# Patient Record
Sex: MALE | Race: WHITE | NOT HISPANIC OR LATINO | Employment: FULL TIME | URBAN - METROPOLITAN AREA
[De-identification: names, ages, dates, MRNs, and addresses within clinical notes are randomized per-mention and may not be internally consistent; named-entity substitution may affect disease eponyms.]

---

## 2017-02-22 ENCOUNTER — TELEPHONE (OUTPATIENT)
Dept: INTERNAL MEDICINE | Facility: CLINIC | Age: 37
End: 2017-02-22

## 2017-02-22 NOTE — TELEPHONE ENCOUNTER
----- Message from FELY Borjas Jr., MD sent at 2/22/2017  6:43 AM CST -----  Contact: pt  Patient needs to be seen.  ----- Message -----     From: Eric Denson LPN     Sent: 2/21/2017   5:05 PM       To: FELY Borjas Jr., MD        ----- Message -----     From: Elissa Romero     Sent: 2/21/2017  10:54 AM       To: Indio Sampson Jr Staff    Pt states he is having problems sleeping and having a hard time breathing after he falls asleep, pt wants to be advised, pt can be reached at [phone]///thxMW

## 2017-02-22 NOTE — TELEPHONE ENCOUNTER
----- Message from FELY Borjas Jr., MD sent at 2/22/2017  6:43 AM CST -----  Contact: pt  Patient needs to be seen.  ----- Message -----     From: Eric Denson LPN     Sent: 2/21/2017   5:05 PM       To: FELY Borjas Jr., MD        ----- Message -----     From: Elissa Romero     Sent: 2/21/2017  10:54 AM       To: Indio Sampson Jr Staff    Pt states he is having problems sleeping and having a hard time breathing after he falls asleep, pt wants to be advised, pt can be reached at [phone]///thxMW

## 2017-10-31 ENCOUNTER — TELEPHONE (OUTPATIENT)
Dept: INTERNAL MEDICINE | Facility: CLINIC | Age: 37
End: 2017-10-31

## 2017-10-31 NOTE — TELEPHONE ENCOUNTER
----- Message from Samantha Lombardo sent at 10/31/2017  8:22 AM CDT -----  Contact: pt  Please call pt @ [phone], pt has a medical question for nurse.

## 2017-10-31 NOTE — TELEPHONE ENCOUNTER
Returned call to pt. Says his wife recently disclosed that she's had an affair, and advised him that she's been exposed to HPV. Wants to know if there's anything he should be concerned about.  Please advise.

## 2017-11-02 NOTE — TELEPHONE ENCOUNTER
"Returned call to pt regarding STD exposure to advise per Dr. Borjas: Not much can be done for male screening for HPV. He needs to come in to be evaluated for other STDs.     Pt states that the affair was over a year ago, and his wife has recently come back with a "clean bill of health." State that's the only thing that he was really concerned about.  "

## 2018-01-03 ENCOUNTER — OFFICE VISIT (OUTPATIENT)
Dept: INTERNAL MEDICINE | Facility: CLINIC | Age: 38
End: 2018-01-03
Payer: COMMERCIAL

## 2018-01-03 ENCOUNTER — LAB VISIT (OUTPATIENT)
Dept: LAB | Facility: HOSPITAL | Age: 38
End: 2018-01-03
Attending: PHYSICIAN ASSISTANT
Payer: COMMERCIAL

## 2018-01-03 VITALS
SYSTOLIC BLOOD PRESSURE: 138 MMHG | OXYGEN SATURATION: 96 % | HEART RATE: 106 BPM | TEMPERATURE: 99 F | BODY MASS INDEX: 47.74 KG/M2 | HEIGHT: 68 IN | DIASTOLIC BLOOD PRESSURE: 106 MMHG | WEIGHT: 315 LBS | RESPIRATION RATE: 18 BRPM

## 2018-01-03 DIAGNOSIS — R35.89 POLYURIA: Primary | ICD-10-CM

## 2018-01-03 DIAGNOSIS — R35.89 POLYURIA: ICD-10-CM

## 2018-01-03 PROBLEM — E66.01 MORBID OBESITY WITH BMI OF 50.0-59.9, ADULT: Status: ACTIVE | Noted: 2018-01-03

## 2018-01-03 LAB
ALBUMIN SERPL BCP-MCNC: 4.2 G/DL
ALP SERPL-CCNC: 62 U/L
ALT SERPL W/O P-5'-P-CCNC: 31 U/L
ANION GAP SERPL CALC-SCNC: 10 MMOL/L
AST SERPL-CCNC: 24 U/L
BACTERIA #/AREA URNS HPF: ABNORMAL /HPF
BASOPHILS # BLD AUTO: 0.04 K/UL
BASOPHILS NFR BLD: 0.5 %
BILIRUB SERPL-MCNC: 0.6 MG/DL
BILIRUB UR QL STRIP: NEGATIVE
BUN SERPL-MCNC: 16 MG/DL
CALCIUM SERPL-MCNC: 11.1 MG/DL
CHLORIDE SERPL-SCNC: 106 MMOL/L
CLARITY UR: CLEAR
CO2 SERPL-SCNC: 28 MMOL/L
COLOR UR: YELLOW
CREAT SERPL-MCNC: 1.6 MG/DL
DIFFERENTIAL METHOD: ABNORMAL
EOSINOPHIL # BLD AUTO: 0.1 K/UL
EOSINOPHIL NFR BLD: 1.4 %
ERYTHROCYTE [DISTWIDTH] IN BLOOD BY AUTOMATED COUNT: 13.5 %
EST. GFR  (AFRICAN AMERICAN): >60 ML/MIN/1.73 M^2
EST. GFR  (NON AFRICAN AMERICAN): 54 ML/MIN/1.73 M^2
ESTIMATED AVG GLUCOSE: 108 MG/DL
GLUCOSE SERPL-MCNC: 94 MG/DL
GLUCOSE UR QL STRIP: NEGATIVE
HBA1C MFR BLD HPLC: 5.4 %
HCT VFR BLD AUTO: 43.8 %
HGB BLD-MCNC: 15.1 G/DL
HGB UR QL STRIP: ABNORMAL
KETONES UR QL STRIP: NEGATIVE
LEUKOCYTE ESTERASE UR QL STRIP: NEGATIVE
LYMPHOCYTES # BLD AUTO: 2.4 K/UL
LYMPHOCYTES NFR BLD: 28.2 %
MCH RBC QN AUTO: 31.2 PG
MCHC RBC AUTO-ENTMCNC: 34.5 G/DL
MCV RBC AUTO: 91 FL
MICROSCOPIC COMMENT: ABNORMAL
MONOCYTES # BLD AUTO: 0.9 K/UL
MONOCYTES NFR BLD: 11.1 %
NEUTROPHILS # BLD AUTO: 5 K/UL
NEUTROPHILS NFR BLD: 58.8 %
NITRITE UR QL STRIP: NEGATIVE
PH UR STRIP: 6 [PH] (ref 5–8)
PLATELET # BLD AUTO: 369 K/UL
PMV BLD AUTO: 9.7 FL
POTASSIUM SERPL-SCNC: 3.9 MMOL/L
PROT SERPL-MCNC: 8.1 G/DL
PROT UR QL STRIP: NEGATIVE
RBC # BLD AUTO: 4.84 M/UL
RBC #/AREA URNS HPF: 6 /HPF (ref 0–4)
SODIUM SERPL-SCNC: 144 MMOL/L
SP GR UR STRIP: 1.01 (ref 1–1.03)
SQUAMOUS #/AREA URNS HPF: 2 /HPF
URN SPEC COLLECT METH UR: ABNORMAL
WBC # BLD AUTO: 8.44 K/UL
WBC #/AREA URNS HPF: 10 /HPF (ref 0–5)

## 2018-01-03 PROCEDURE — 87086 URINE CULTURE/COLONY COUNT: CPT

## 2018-01-03 PROCEDURE — 87088 URINE BACTERIA CULTURE: CPT

## 2018-01-03 PROCEDURE — 99999 PR PBB SHADOW E&M-EST. PATIENT-LVL IV: CPT | Mod: PBBFAC,,, | Performed by: PHYSICIAN ASSISTANT

## 2018-01-03 PROCEDURE — 36415 COLL VENOUS BLD VENIPUNCTURE: CPT | Mod: PO

## 2018-01-03 PROCEDURE — 85025 COMPLETE CBC W/AUTO DIFF WBC: CPT | Mod: PO

## 2018-01-03 PROCEDURE — 83036 HEMOGLOBIN GLYCOSYLATED A1C: CPT

## 2018-01-03 PROCEDURE — 99203 OFFICE O/P NEW LOW 30 MIN: CPT | Mod: S$GLB,,, | Performed by: PHYSICIAN ASSISTANT

## 2018-01-03 PROCEDURE — 81000 URINALYSIS NONAUTO W/SCOPE: CPT | Mod: PO

## 2018-01-03 PROCEDURE — 80053 COMPREHEN METABOLIC PANEL: CPT | Mod: PO

## 2018-01-03 RX ORDER — IBUPROFEN 200 MG
200 TABLET ORAL
COMMUNITY
End: 2021-01-05

## 2018-01-03 NOTE — PROGRESS NOTES
1.  Subjective:       Patient ID: Basilio Jain is a 37 y.o.W/ male.    Chief Complaint: Urinary Frequency; Urinary Urgency; and Dysuria    HPI         He comes in by himself today and has the above problem.  He is essentially a new patient to the clinic.  About 4-5 days ago he started urinating almost every hour during the daytime and he got up several times at night to do the same thing.  He doesn't really have any dysuria, pyuria, hematuria, urethral discharge, increase in normal odor, polydipsia, or any sores on his body that are slow to heal.  He has no history of diabetes.  He says his wife did have [facility]IC once about 18 months ago and she did have her self checked for STDs.  They didn't find her to have any except for HPV.  He has not himself noticed any warts on the penis or near the opening of the urethral meatus.  She has also had a Pap smear since that time and did not have any warts there also.    Review of Systems    Otherwise negative concerning the , INTEGUMENT, and STD system review.    Objective:      Physical Exam    Random fingerstick glucose here was 96.  CHEST: Clear BS anterior to posterior.  Intensity is normal.  There is no wheeze, rhonchi, or rales heard.  HEART: RSR.  S1 is greater than S2.  There is no murmur or gallop.  ABDOMEN: Exogenously obese with normal bowel sounds.  Nontender.  There is no guarding or rebound.  There is no organomegaly or mass felt palpation percussion.    Assessment:       1. Polyuria        Plan:       1.  Will get UA, C&S of urine, CBC, Chem-20, Hgb A1c, FLP's and follow-up after these tests are done.

## 2018-01-04 ENCOUNTER — PATIENT MESSAGE (OUTPATIENT)
Dept: INTERNAL MEDICINE | Facility: CLINIC | Age: 38
End: 2018-01-04

## 2018-01-04 LAB — BACTERIA UR CULT: NORMAL

## 2018-01-08 DIAGNOSIS — N30.00 ACUTE CYSTITIS WITHOUT HEMATURIA: Primary | ICD-10-CM

## 2018-01-08 RX ORDER — AZITHROMYCIN 500 MG/1
500 TABLET, FILM COATED ORAL DAILY
Qty: 10 TABLET | Refills: 0 | Status: SHIPPED | OUTPATIENT
Start: 2018-01-08 | End: 2018-01-18

## 2018-01-10 ENCOUNTER — TELEPHONE (OUTPATIENT)
Dept: INTERNAL MEDICINE | Facility: CLINIC | Age: 38
End: 2018-01-10

## 2018-01-10 NOTE — TELEPHONE ENCOUNTER
Called pt. to notified him of next Annual Appointment which is on 6/4/2018. No answer. Left voicemail.

## 2018-03-26 NOTE — TELEPHONE ENCOUNTER
Called patient to schedule appt per request from Dr. Borjas. He verbalized understanding.//rlt   Patient is calling in needing an appt for his swollen left testicle. Please advise him with an appt.

## 2020-12-29 ENCOUNTER — PATIENT MESSAGE (OUTPATIENT)
Dept: INTERNAL MEDICINE | Facility: CLINIC | Age: 40
End: 2020-12-29

## 2020-12-29 ENCOUNTER — OFFICE VISIT (OUTPATIENT)
Dept: CARDIOLOGY | Facility: CLINIC | Age: 40
End: 2020-12-29
Payer: COMMERCIAL

## 2020-12-29 ENCOUNTER — HOSPITAL ENCOUNTER (OUTPATIENT)
Dept: CARDIOLOGY | Facility: HOSPITAL | Age: 40
Discharge: HOME | End: 2020-12-29
Attending: PHYSICIAN ASSISTANT
Payer: COMMERCIAL

## 2020-12-29 ENCOUNTER — HOSPITAL ENCOUNTER (OUTPATIENT)
Dept: RADIOLOGY | Facility: HOSPITAL | Age: 40
Discharge: HOME | End: 2020-12-29
Attending: PHYSICIAN ASSISTANT
Payer: COMMERCIAL

## 2020-12-29 ENCOUNTER — OFFICE VISIT (OUTPATIENT)
Dept: INTERNAL MEDICINE | Facility: CLINIC | Age: 40
End: 2020-12-29
Payer: COMMERCIAL

## 2020-12-29 VITALS
OXYGEN SATURATION: 97 % | HEART RATE: 105 BPM | SYSTOLIC BLOOD PRESSURE: 142 MMHG | DIASTOLIC BLOOD PRESSURE: 78 MMHG | BODY MASS INDEX: 46.65 KG/M2 | TEMPERATURE: 99 F | HEIGHT: 69 IN | WEIGHT: 315 LBS | RESPIRATION RATE: 20 BRPM

## 2020-12-29 VITALS
SYSTOLIC BLOOD PRESSURE: 140 MMHG | BODY MASS INDEX: 54.11 KG/M2 | WEIGHT: 315 LBS | OXYGEN SATURATION: 99 % | DIASTOLIC BLOOD PRESSURE: 88 MMHG | HEART RATE: 106 BPM

## 2020-12-29 DIAGNOSIS — Z01.818 PRE-OP EXAM: ICD-10-CM

## 2020-12-29 DIAGNOSIS — E66.01 MORBID OBESITY WITH BMI OF 50.0-59.9, ADULT: ICD-10-CM

## 2020-12-29 DIAGNOSIS — R94.31 ABNORMAL EKG: ICD-10-CM

## 2020-12-29 DIAGNOSIS — I10 ESSENTIAL HYPERTENSION: ICD-10-CM

## 2020-12-29 DIAGNOSIS — Z01.818 PRE-OP EXAM: Primary | ICD-10-CM

## 2020-12-29 DIAGNOSIS — Z01.810 PREOP CARDIOVASCULAR EXAM: Primary | ICD-10-CM

## 2020-12-29 DIAGNOSIS — R06.83 SNORES: ICD-10-CM

## 2020-12-29 DIAGNOSIS — Z00.00 PREVENTATIVE HEALTH CARE: ICD-10-CM

## 2020-12-29 PROCEDURE — 99999 PR PBB SHADOW E&M-EST. PATIENT-LVL IV: CPT | Mod: PBBFAC,,, | Performed by: INTERNAL MEDICINE

## 2020-12-29 PROCEDURE — 71046 X-RAY EXAM CHEST 2 VIEWS: CPT | Mod: 26,,, | Performed by: RADIOLOGY

## 2020-12-29 PROCEDURE — 3008F PR BODY MASS INDEX (BMI) DOCUMENTED: ICD-10-PCS | Mod: CPTII,S$GLB,, | Performed by: PHYSICIAN ASSISTANT

## 2020-12-29 PROCEDURE — 99244 PR OFFICE CONSULTATION,LEVEL IV: ICD-10-PCS | Mod: S$PBB,,, | Performed by: INTERNAL MEDICINE

## 2020-12-29 PROCEDURE — 71046 X-RAY EXAM CHEST 2 VIEWS: CPT | Mod: TC

## 2020-12-29 PROCEDURE — 99999 PR PBB SHADOW E&M-EST. PATIENT-LVL IV: ICD-10-PCS | Mod: PBBFAC,,, | Performed by: INTERNAL MEDICINE

## 2020-12-29 PROCEDURE — 99214 PR OFFICE/OUTPT VISIT, EST, LEVL IV, 30-39 MIN: ICD-10-PCS | Mod: S$GLB,,, | Performed by: PHYSICIAN ASSISTANT

## 2020-12-29 PROCEDURE — 99999 PR PBB SHADOW E&M-EST. PATIENT-LVL V: ICD-10-PCS | Mod: PBBFAC,,, | Performed by: PHYSICIAN ASSISTANT

## 2020-12-29 PROCEDURE — 99244 OFF/OP CNSLTJ NEW/EST MOD 40: CPT | Mod: S$PBB,,, | Performed by: INTERNAL MEDICINE

## 2020-12-29 PROCEDURE — 99214 OFFICE O/P EST MOD 30 MIN: CPT | Mod: S$GLB,,, | Performed by: PHYSICIAN ASSISTANT

## 2020-12-29 PROCEDURE — 1126F PR PAIN SEVERITY QUANTIFIED, NO PAIN PRESENT: ICD-10-PCS | Mod: S$GLB,,, | Performed by: PHYSICIAN ASSISTANT

## 2020-12-29 PROCEDURE — 3008F BODY MASS INDEX DOCD: CPT | Mod: CPTII,S$GLB,, | Performed by: PHYSICIAN ASSISTANT

## 2020-12-29 PROCEDURE — 99999 PR PBB SHADOW E&M-EST. PATIENT-LVL V: CPT | Mod: PBBFAC,,, | Performed by: PHYSICIAN ASSISTANT

## 2020-12-29 PROCEDURE — 71046 XR CHEST PA AND LATERAL: ICD-10-PCS | Mod: 26,,, | Performed by: RADIOLOGY

## 2020-12-29 PROCEDURE — 1126F AMNT PAIN NOTED NONE PRSNT: CPT | Mod: S$GLB,,, | Performed by: PHYSICIAN ASSISTANT

## 2020-12-29 PROCEDURE — 99214 OFFICE O/P EST MOD 30 MIN: CPT | Mod: PBBFAC,25 | Performed by: INTERNAL MEDICINE

## 2020-12-29 RX ORDER — PREDNISONE 10 MG/1
TABLET ORAL
COMMUNITY
Start: 2020-12-23 | End: 2021-01-05

## 2020-12-29 RX ORDER — BUDESONIDE AND FORMOTEROL FUMARATE DIHYDRATE 160; 4.5 UG/1; UG/1
AEROSOL RESPIRATORY (INHALATION)
COMMUNITY
Start: 2020-12-23 | End: 2021-02-02 | Stop reason: ALTCHOICE

## 2020-12-29 RX ORDER — HYDROCODONE POLISTIREX AND CHLORPHENIRAMINE POLISTIREX 10; 8 MG/5ML; MG/5ML
SUSPENSION, EXTENDED RELEASE ORAL
COMMUNITY
Start: 2020-12-23 | End: 2020-12-29 | Stop reason: SDUPTHER

## 2020-12-29 RX ORDER — SOD CHLOR,BICARB/SQUEEZ BOTTLE
PACKET, WITH RINSE DEVICE NASAL
COMMUNITY
Start: 2020-12-15 | End: 2023-01-27 | Stop reason: ALTCHOICE

## 2020-12-29 RX ORDER — LOSARTAN POTASSIUM 25 MG/1
25 TABLET ORAL DAILY
Qty: 30 TABLET | Refills: 5 | Status: SHIPPED | OUTPATIENT
Start: 2020-12-29 | End: 2021-02-01

## 2020-12-29 RX ORDER — CHLOPHEDIANOL HYDROCHLORIDE, DEXBROMPHENIRAMINE MALEATE 12.5; 1 MG/5ML; MG/5ML
LIQUID ORAL
COMMUNITY
Start: 2020-12-17 | End: 2021-02-02 | Stop reason: ALTCHOICE

## 2020-12-29 RX ORDER — CEFDINIR 300 MG/1
300 CAPSULE ORAL 2 TIMES DAILY
COMMUNITY
Start: 2020-12-23 | End: 2021-01-05

## 2020-12-29 NOTE — PROGRESS NOTES
"Subjective:      Patient ID: Basilio Jain is a 40 y.o. male.    Chief Complaint: Pre-op Exam (complains of HTN)    Patient is new to me, being seen today for pre-op clearance.  H/o chronic sinusitis, deviated septum, turbinate hypertrophy.  Surgery scheduled 12/31- septoplasty- turb reducation.  12/20 steroid and antibiotic shot with oral antibiotics, symbicort and nasal spray.  General anesthesia- denies ever having been under anesthesia, denies family history of reaction to anesthesia     Reports persistent sinus/coough symptoms for months.  Some improvement with recent antibiotics and steroids.      Denies any personal BP issues, was borderline previously, typically 140-150s/70s at home     Had blood work at [facility] yesterday.      Patient has not been seen in clinic since January 2018.  Would like to establish with new PCP, preferably [facility].      Review of Systems   Constitutional: Negative for chills, diaphoresis and fever.   HENT: Positive for congestion and sinus pressure. Negative for rhinorrhea and sore throat.    Respiratory: Positive for cough (improved). Negative for shortness of breath and wheezing.    Cardiovascular: Positive for chest pain (occasional R sided pain with coughing or movement, attributes to coughing for several months d/t sinus symptoms). Negative for palpitations and leg swelling.   Gastrointestinal: Negative for abdominal pain, constipation, diarrhea, nausea and vomiting.   Skin: Negative for rash.   Neurological: Negative for dizziness, light-headedness and headaches.       Objective:   BP (!) 142/78   Pulse 105   Temp 98.5 °F (36.9 °C) (Tympanic)   Resp 20   Ht 5' 9" (1.753 m)   Wt (!) 165.7 kg (365 lb 4.8 oz)   SpO2 97%   BMI 53.95 kg/m²   Physical Exam  Constitutional:       General: He is not in acute distress.     Appearance: Normal appearance. He is well-developed. He is not ill-appearing.   HENT:      Head: Normocephalic and atraumatic.      Right Ear: " Hearing, tympanic membrane, ear canal and external ear normal.      Left Ear: Hearing, tympanic membrane, ear canal and external ear normal.      Nose: Nose normal. No mucosal edema.      Mouth/Throat:      Pharynx: Uvula midline. No posterior oropharyngeal erythema.   Neck:      Thyroid: No thyromegaly.      Trachea: Trachea normal.   Cardiovascular:      Rate and Rhythm: Normal rate and regular rhythm.      Heart sounds: Normal heart sounds. No murmur.   Pulmonary:      Effort: Pulmonary effort is normal. No respiratory distress.      Breath sounds: Normal breath sounds. No decreased breath sounds, wheezing, rhonchi or rales.   Abdominal:      General: Bowel sounds are normal. There is no distension.      Palpations: Abdomen is soft.      Tenderness: There is no abdominal tenderness.   Skin:     General: Skin is warm and dry.      Findings: No rash.   Neurological:      Mental Status: He is alert and oriented to person, place, and time.      Cranial Nerves: No cranial nerve deficit.      Sensory: No sensory deficit.      Gait: Gait normal.      Comments: CN 3, 4, 6, 7, 11 and 12 checked and intact   Psychiatric:         Mood and Affect: Mood is anxious.         Speech: Speech normal.         Behavior: Behavior normal.         Thought Content: Thought content normal.       Assessment:      1. Pre-op exam    2. Preventative health care    3. Abnormal EKG       Plan:   Pre-op exam  -     EKG 12-lead; Future  -     X-Ray Chest PA And Lateral; Future; Expected date: 12/29/2020    Preventative health care  -     CBC Auto Differential; Future; Expected date: 12/29/2020  -     Comprehensive Metabolic Panel; Future; Expected date: 12/29/2020  -     Hemoglobin A1C; Future; Expected date: 12/29/2020  -     Lipid Panel; Future; Expected date: 12/29/2020  -     TSH; Future; Expected date: 12/29/2020    Abnormal EKG  -     Ambulatory referral/consult to Cardiology; Future; Expected date: 01/05/2021      BP elevated, he will  monitor at home and keep log and bring to f/u appt  May need to consider BP medication if it remains elevated   Discussed lifestyle modifications     After much discussion with patient, it was decided he will wait on procedure.  He is a bit anxious in regards to rush of procedure before the end of the year.  EKG with prolonged QT and BP borderline today without annual lab/exam in many years.  In order to minimize risks would like to get BP controlled prior to surgery.      F/u scheduled with Dr. Yu     Discussed worsening signs/symptoms and when to return to clinic or go to ED.   Patient expresses understanding and agrees with treatment plan.

## 2020-12-30 ENCOUNTER — PATIENT MESSAGE (OUTPATIENT)
Dept: INTERNAL MEDICINE | Facility: CLINIC | Age: 40
End: 2020-12-30

## 2021-01-05 ENCOUNTER — OFFICE VISIT (OUTPATIENT)
Dept: INTERNAL MEDICINE | Facility: CLINIC | Age: 41
End: 2021-01-05
Payer: COMMERCIAL

## 2021-01-05 ENCOUNTER — LAB VISIT (OUTPATIENT)
Dept: LAB | Facility: HOSPITAL | Age: 41
End: 2021-01-05
Attending: FAMILY MEDICINE
Payer: COMMERCIAL

## 2021-01-05 VITALS
TEMPERATURE: 98 F | HEIGHT: 69 IN | WEIGHT: 315 LBS | HEART RATE: 70 BPM | DIASTOLIC BLOOD PRESSURE: 86 MMHG | SYSTOLIC BLOOD PRESSURE: 128 MMHG | BODY MASS INDEX: 46.65 KG/M2

## 2021-01-05 DIAGNOSIS — R73.03 PRE-DIABETES: ICD-10-CM

## 2021-01-05 DIAGNOSIS — Z00.00 ROUTINE GENERAL MEDICAL EXAMINATION AT A HEALTH CARE FACILITY: Primary | ICD-10-CM

## 2021-01-05 DIAGNOSIS — Z28.39 IMMUNIZATION DEFICIENCY: ICD-10-CM

## 2021-01-05 DIAGNOSIS — Z00.00 ROUTINE GENERAL MEDICAL EXAMINATION AT A HEALTH CARE FACILITY: ICD-10-CM

## 2021-01-05 LAB
25(OH)D3+25(OH)D2 SERPL-MCNC: 24 NG/ML (ref 30–96)
FERRITIN SERPL-MCNC: 124 NG/ML (ref 20–300)
IRON SERPL-MCNC: 71 UG/DL (ref 45–160)
SATURATED IRON: 16 % (ref 20–50)
TOTAL IRON BINDING CAPACITY: 437 UG/DL (ref 250–450)
TRANSFERRIN SERPL-MCNC: 295 MG/DL (ref 200–375)

## 2021-01-05 PROCEDURE — 82306 VITAMIN D 25 HYDROXY: CPT

## 2021-01-05 PROCEDURE — 83525 ASSAY OF INSULIN: CPT

## 2021-01-05 PROCEDURE — 3074F SYST BP LT 130 MM HG: CPT | Mod: CPTII,S$GLB,, | Performed by: FAMILY MEDICINE

## 2021-01-05 PROCEDURE — 3008F BODY MASS INDEX DOCD: CPT | Mod: CPTII,S$GLB,, | Performed by: FAMILY MEDICINE

## 2021-01-05 PROCEDURE — 82728 ASSAY OF FERRITIN: CPT

## 2021-01-05 PROCEDURE — 90471 IMMUNIZATION ADMIN: CPT | Mod: S$GLB,,, | Performed by: FAMILY MEDICINE

## 2021-01-05 PROCEDURE — 99396 PR PREVENTIVE VISIT,EST,40-64: ICD-10-PCS | Mod: 25,S$GLB,, | Performed by: FAMILY MEDICINE

## 2021-01-05 PROCEDURE — 1126F AMNT PAIN NOTED NONE PRSNT: CPT | Mod: S$GLB,,, | Performed by: FAMILY MEDICINE

## 2021-01-05 PROCEDURE — 3079F PR MOST RECENT DIASTOLIC BLOOD PRESSURE 80-89 MM HG: ICD-10-PCS | Mod: CPTII,S$GLB,, | Performed by: FAMILY MEDICINE

## 2021-01-05 PROCEDURE — 86803 HEPATITIS C AB TEST: CPT

## 2021-01-05 PROCEDURE — 3074F PR MOST RECENT SYSTOLIC BLOOD PRESSURE < 130 MM HG: ICD-10-PCS | Mod: CPTII,S$GLB,, | Performed by: FAMILY MEDICINE

## 2021-01-05 PROCEDURE — 1126F PR PAIN SEVERITY QUANTIFIED, NO PAIN PRESENT: ICD-10-PCS | Mod: S$GLB,,, | Performed by: FAMILY MEDICINE

## 2021-01-05 PROCEDURE — 3008F PR BODY MASS INDEX (BMI) DOCUMENTED: ICD-10-PCS | Mod: CPTII,S$GLB,, | Performed by: FAMILY MEDICINE

## 2021-01-05 PROCEDURE — 83540 ASSAY OF IRON: CPT

## 2021-01-05 PROCEDURE — 99999 PR PBB SHADOW E&M-EST. PATIENT-LVL IV: ICD-10-PCS | Mod: PBBFAC,,, | Performed by: FAMILY MEDICINE

## 2021-01-05 PROCEDURE — 99999 PR PBB SHADOW E&M-EST. PATIENT-LVL IV: CPT | Mod: PBBFAC,,, | Performed by: FAMILY MEDICINE

## 2021-01-05 PROCEDURE — 90471 TDAP VACCINE GREATER THAN OR EQUAL TO 7YO IM: ICD-10-PCS | Mod: S$GLB,,, | Performed by: FAMILY MEDICINE

## 2021-01-05 PROCEDURE — 99396 PREV VISIT EST AGE 40-64: CPT | Mod: 25,S$GLB,, | Performed by: FAMILY MEDICINE

## 2021-01-05 PROCEDURE — 86703 HIV-1/HIV-2 1 RESULT ANTBDY: CPT

## 2021-01-05 PROCEDURE — 90715 TDAP VACCINE 7 YRS/> IM: CPT | Mod: S$GLB,,, | Performed by: FAMILY MEDICINE

## 2021-01-05 PROCEDURE — 3079F DIAST BP 80-89 MM HG: CPT | Mod: CPTII,S$GLB,, | Performed by: FAMILY MEDICINE

## 2021-01-05 PROCEDURE — 90715 TDAP VACCINE GREATER THAN OR EQUAL TO 7YO IM: ICD-10-PCS | Mod: S$GLB,,, | Performed by: FAMILY MEDICINE

## 2021-01-06 ENCOUNTER — PATIENT MESSAGE (OUTPATIENT)
Dept: PRIMARY CARE CLINIC | Facility: CLINIC | Age: 41
End: 2021-01-06

## 2021-01-06 ENCOUNTER — TELEPHONE (OUTPATIENT)
Dept: PRIMARY CARE CLINIC | Facility: CLINIC | Age: 41
End: 2021-01-06

## 2021-01-06 LAB
HCV AB SERPL QL IA: NEGATIVE
HIV 1+2 AB+HIV1 P24 AG SERPL QL IA: NEGATIVE
INSULIN COLLECTION INTERVAL: ABNORMAL
INSULIN SERPL-ACNC: 28.6 UU/ML

## 2021-01-08 ENCOUNTER — PATIENT MESSAGE (OUTPATIENT)
Dept: CARDIOLOGY | Facility: CLINIC | Age: 41
End: 2021-01-08

## 2021-01-14 ENCOUNTER — HOSPITAL ENCOUNTER (OUTPATIENT)
Dept: CARDIOLOGY | Facility: HOSPITAL | Age: 41
Discharge: HOME | End: 2021-01-14
Attending: INTERNAL MEDICINE
Payer: COMMERCIAL

## 2021-01-14 ENCOUNTER — TELEPHONE (OUTPATIENT)
Dept: CARDIOLOGY | Facility: CLINIC | Age: 41
End: 2021-01-14

## 2021-01-14 VITALS — WEIGHT: 315 LBS | HEIGHT: 69 IN | BODY MASS INDEX: 46.65 KG/M2

## 2021-01-14 DIAGNOSIS — R94.31 ABNORMAL EKG: ICD-10-CM

## 2021-01-14 DIAGNOSIS — Z01.810 PREOP CARDIOVASCULAR EXAM: ICD-10-CM

## 2021-01-14 PROCEDURE — 93306 ECHO (CUPID ONLY): ICD-10-PCS | Mod: 26,,, | Performed by: INTERNAL MEDICINE

## 2021-01-14 PROCEDURE — 93306 TTE W/DOPPLER COMPLETE: CPT | Mod: 26,,, | Performed by: INTERNAL MEDICINE

## 2021-01-14 PROCEDURE — 93306 TTE W/DOPPLER COMPLETE: CPT | Mod: PO

## 2021-01-15 LAB
AORTIC ROOT ANNULUS: 3.83 CM
AV INDEX (PROSTH): 0.82
AV MEAN GRADIENT: 4 MMHG
AV PEAK GRADIENT: 7 MMHG
AV VALVE AREA: 2.52 CM2
AV VELOCITY RATIO: 0.93
BSA FOR ECHO PROCEDURE: 2.85 M2
CV ECHO LV RWT: 0.44 CM
DOP CALC AO PEAK VEL: 1.34 M/S
DOP CALC AO VTI: 27.25 CM
DOP CALC LVOT AREA: 3.1 CM2
DOP CALC LVOT DIAMETER: 1.98 CM
DOP CALC LVOT PEAK VEL: 1.24 M/S
DOP CALC LVOT STROKE VOLUME: 68.54 CM3
DOP CALC RVOT PEAK VEL: 0.75 M/S
DOP CALC RVOT VTI: 16.55 CM
DOP CALCLVOT PEAK VEL VTI: 22.27 CM
E WAVE DECELERATION TIME: 314.79 MSEC
E/A RATIO: 1.01
E/E' RATIO: 9.79 M/S
ECHO LV POSTERIOR WALL: 1.2 CM (ref 0.6–1.1)
FRACTIONAL SHORTENING: 32 % (ref 28–44)
INTERVENTRICULAR SEPTUM: 1.11 CM (ref 0.6–1.1)
IVRT: 71.36 MSEC
LA MAJOR: 4.83 CM
LA MINOR: 4.42 CM
LA WIDTH: 3.69 CM
LEFT ATRIUM SIZE: 4.18 CM
LEFT ATRIUM VOLUME INDEX: 22.6 ML/M2
LEFT ATRIUM VOLUME: 60.52 CM3
LEFT INTERNAL DIMENSION IN SYSTOLE: 3.76 CM (ref 2.1–4)
LEFT VENTRICLE DIASTOLIC VOLUME INDEX: 54.97 ML/M2
LEFT VENTRICLE DIASTOLIC VOLUME: 146.96 ML
LEFT VENTRICLE MASS INDEX: 96 G/M2
LEFT VENTRICLE SYSTOLIC VOLUME INDEX: 22.5 ML/M2
LEFT VENTRICLE SYSTOLIC VOLUME: 60.22 ML
LEFT VENTRICULAR INTERNAL DIMENSION IN DIASTOLE: 5.49 CM (ref 3.5–6)
LEFT VENTRICULAR MASS: 257.76 G
LV LATERAL E/E' RATIO: 9.3 M/S
LV SEPTAL E/E' RATIO: 10.33 M/S
MV PEAK A VEL: 0.92 M/S
MV PEAK E VEL: 0.93 M/S
PISA TR MAX VEL: 1.95 M/S
PV MEAN GRADIENT: 1 MMHG
PV PEAK VELOCITY: 1.09 CM/S
RA MAJOR: 4.36 CM
RA WIDTH: 2.58 CM
RIGHT VENTRICULAR END-DIASTOLIC DIMENSION: 2.48 CM
TDI LATERAL: 0.1 M/S
TDI SEPTAL: 0.09 M/S
TDI: 0.1 M/S
TR MAX PG: 15 MMHG

## 2021-01-20 ENCOUNTER — TELEPHONE (OUTPATIENT)
Dept: CARDIOLOGY | Facility: CLINIC | Age: 41
End: 2021-01-20

## 2021-01-20 ENCOUNTER — PATIENT MESSAGE (OUTPATIENT)
Dept: INTERNAL MEDICINE | Facility: CLINIC | Age: 41
End: 2021-01-20

## 2021-02-01 ENCOUNTER — PATIENT MESSAGE (OUTPATIENT)
Dept: CARDIOLOGY | Facility: CLINIC | Age: 41
End: 2021-02-01

## 2021-02-01 RX ORDER — HYDROCHLOROTHIAZIDE 12.5 MG/1
12.5 TABLET ORAL DAILY
Qty: 30 TABLET | Refills: 3 | Status: SHIPPED | OUTPATIENT
Start: 2021-02-01 | End: 2021-03-05 | Stop reason: SDUPTHER

## 2021-02-02 ENCOUNTER — TELEPHONE (OUTPATIENT)
Dept: INTERNAL MEDICINE | Facility: CLINIC | Age: 41
End: 2021-02-02

## 2021-02-02 ENCOUNTER — OFFICE VISIT (OUTPATIENT)
Dept: INTERNAL MEDICINE | Facility: CLINIC | Age: 41
End: 2021-02-02
Payer: COMMERCIAL

## 2021-02-02 VITALS
WEIGHT: 315 LBS | SYSTOLIC BLOOD PRESSURE: 126 MMHG | HEART RATE: 70 BPM | TEMPERATURE: 98 F | BODY MASS INDEX: 46.65 KG/M2 | DIASTOLIC BLOOD PRESSURE: 84 MMHG | HEIGHT: 69 IN

## 2021-02-02 DIAGNOSIS — Z01.818 PREOP GENERAL PHYSICAL EXAM: Primary | ICD-10-CM

## 2021-02-02 DIAGNOSIS — I10 ESSENTIAL HYPERTENSION: ICD-10-CM

## 2021-02-02 DIAGNOSIS — R73.03 PRE-DIABETES: ICD-10-CM

## 2021-02-02 PROBLEM — Z00.00 ROUTINE GENERAL MEDICAL EXAMINATION AT A HEALTH CARE FACILITY: Status: RESOLVED | Noted: 2021-01-05 | Resolved: 2021-02-02

## 2021-02-02 PROCEDURE — 1126F AMNT PAIN NOTED NONE PRSNT: CPT | Mod: S$GLB,,, | Performed by: FAMILY MEDICINE

## 2021-02-02 PROCEDURE — 99214 OFFICE O/P EST MOD 30 MIN: CPT | Mod: S$GLB,,, | Performed by: FAMILY MEDICINE

## 2021-02-02 PROCEDURE — 99999 PR PBB SHADOW E&M-EST. PATIENT-LVL III: ICD-10-PCS | Mod: PBBFAC,,, | Performed by: FAMILY MEDICINE

## 2021-02-02 PROCEDURE — 3074F SYST BP LT 130 MM HG: CPT | Mod: CPTII,S$GLB,, | Performed by: FAMILY MEDICINE

## 2021-02-02 PROCEDURE — 1126F PR PAIN SEVERITY QUANTIFIED, NO PAIN PRESENT: ICD-10-PCS | Mod: S$GLB,,, | Performed by: FAMILY MEDICINE

## 2021-02-02 PROCEDURE — 3079F DIAST BP 80-89 MM HG: CPT | Mod: CPTII,S$GLB,, | Performed by: FAMILY MEDICINE

## 2021-02-02 PROCEDURE — 3008F BODY MASS INDEX DOCD: CPT | Mod: CPTII,S$GLB,, | Performed by: FAMILY MEDICINE

## 2021-02-02 PROCEDURE — 3079F PR MOST RECENT DIASTOLIC BLOOD PRESSURE 80-89 MM HG: ICD-10-PCS | Mod: CPTII,S$GLB,, | Performed by: FAMILY MEDICINE

## 2021-02-02 PROCEDURE — 3008F PR BODY MASS INDEX (BMI) DOCUMENTED: ICD-10-PCS | Mod: CPTII,S$GLB,, | Performed by: FAMILY MEDICINE

## 2021-02-02 PROCEDURE — 3074F PR MOST RECENT SYSTOLIC BLOOD PRESSURE < 130 MM HG: ICD-10-PCS | Mod: CPTII,S$GLB,, | Performed by: FAMILY MEDICINE

## 2021-02-02 PROCEDURE — 99999 PR PBB SHADOW E&M-EST. PATIENT-LVL III: CPT | Mod: PBBFAC,,, | Performed by: FAMILY MEDICINE

## 2021-02-02 PROCEDURE — 99214 PR OFFICE/OUTPT VISIT, EST, LEVL IV, 30-39 MIN: ICD-10-PCS | Mod: S$GLB,,, | Performed by: FAMILY MEDICINE

## 2021-02-02 RX ORDER — METFORMIN HYDROCHLORIDE 500 MG/1
500 TABLET ORAL 2 TIMES DAILY WITH MEALS
Qty: 180 TABLET | Refills: 0 | Status: SHIPPED | OUTPATIENT
Start: 2021-02-02 | End: 2021-07-30 | Stop reason: SDUPTHER

## 2021-02-04 ENCOUNTER — TELEPHONE (OUTPATIENT)
Dept: CARDIOLOGY | Facility: CLINIC | Age: 41
End: 2021-02-04

## 2021-02-23 ENCOUNTER — OFFICE VISIT (OUTPATIENT)
Dept: INTERNAL MEDICINE | Facility: CLINIC | Age: 41
End: 2021-02-23
Payer: COMMERCIAL

## 2021-02-23 ENCOUNTER — TELEPHONE (OUTPATIENT)
Dept: INTERNAL MEDICINE | Facility: CLINIC | Age: 41
End: 2021-02-23

## 2021-02-23 VITALS — DIASTOLIC BLOOD PRESSURE: 76 MMHG | SYSTOLIC BLOOD PRESSURE: 122 MMHG

## 2021-02-23 DIAGNOSIS — Z98.890 STATUS POST NASAL SURGERY: Primary | ICD-10-CM

## 2021-02-23 DIAGNOSIS — R73.03 PRE-DIABETES: ICD-10-CM

## 2021-02-23 DIAGNOSIS — I10 ESSENTIAL HYPERTENSION: ICD-10-CM

## 2021-02-23 PROBLEM — Z01.818 PREOP GENERAL PHYSICAL EXAM: Status: RESOLVED | Noted: 2021-02-02 | Resolved: 2021-02-23

## 2021-02-23 PROCEDURE — 99214 OFFICE O/P EST MOD 30 MIN: CPT | Mod: 95,,, | Performed by: FAMILY MEDICINE

## 2021-02-23 PROCEDURE — 99214 PR OFFICE/OUTPT VISIT, EST, LEVL IV, 30-39 MIN: ICD-10-PCS | Mod: 95,,, | Performed by: FAMILY MEDICINE

## 2021-02-23 PROCEDURE — 3078F PR MOST RECENT DIASTOLIC BLOOD PRESSURE < 80 MM HG: ICD-10-PCS | Mod: CPTII,,, | Performed by: FAMILY MEDICINE

## 2021-02-23 PROCEDURE — 3074F SYST BP LT 130 MM HG: CPT | Mod: CPTII,,, | Performed by: FAMILY MEDICINE

## 2021-02-23 PROCEDURE — 3074F PR MOST RECENT SYSTOLIC BLOOD PRESSURE < 130 MM HG: ICD-10-PCS | Mod: CPTII,,, | Performed by: FAMILY MEDICINE

## 2021-02-23 PROCEDURE — 3078F DIAST BP <80 MM HG: CPT | Mod: CPTII,,, | Performed by: FAMILY MEDICINE

## 2021-03-04 ENCOUNTER — PATIENT MESSAGE (OUTPATIENT)
Dept: INTERNAL MEDICINE | Facility: CLINIC | Age: 41
End: 2021-03-04

## 2021-03-05 PROBLEM — Z98.890 STATUS POST NASAL SURGERY: Status: ACTIVE | Noted: 2021-03-05

## 2021-03-05 RX ORDER — HYDROCHLOROTHIAZIDE 12.5 MG/1
12.5 TABLET ORAL DAILY
Qty: 90 TABLET | Refills: 1 | Status: SHIPPED | OUTPATIENT
Start: 2021-03-05 | End: 2021-09-17

## 2021-03-08 ENCOUNTER — OFFICE VISIT (OUTPATIENT)
Dept: INTERNAL MEDICINE | Facility: CLINIC | Age: 41
End: 2021-03-08
Payer: COMMERCIAL

## 2021-03-08 VITALS — SYSTOLIC BLOOD PRESSURE: 120 MMHG | DIASTOLIC BLOOD PRESSURE: 70 MMHG

## 2021-03-08 DIAGNOSIS — I10 ESSENTIAL HYPERTENSION: ICD-10-CM

## 2021-03-08 DIAGNOSIS — F41.1 GAD (GENERALIZED ANXIETY DISORDER): Primary | ICD-10-CM

## 2021-03-08 PROCEDURE — 3078F PR MOST RECENT DIASTOLIC BLOOD PRESSURE < 80 MM HG: ICD-10-PCS | Mod: CPTII,,, | Performed by: FAMILY MEDICINE

## 2021-03-08 PROCEDURE — 3078F DIAST BP <80 MM HG: CPT | Mod: CPTII,,, | Performed by: FAMILY MEDICINE

## 2021-03-08 PROCEDURE — 99214 PR OFFICE/OUTPT VISIT, EST, LEVL IV, 30-39 MIN: ICD-10-PCS | Mod: 95,,, | Performed by: FAMILY MEDICINE

## 2021-03-08 PROCEDURE — 3074F PR MOST RECENT SYSTOLIC BLOOD PRESSURE < 130 MM HG: ICD-10-PCS | Mod: CPTII,,, | Performed by: FAMILY MEDICINE

## 2021-03-08 PROCEDURE — 3074F SYST BP LT 130 MM HG: CPT | Mod: CPTII,,, | Performed by: FAMILY MEDICINE

## 2021-03-08 PROCEDURE — 99214 OFFICE O/P EST MOD 30 MIN: CPT | Mod: 95,,, | Performed by: FAMILY MEDICINE

## 2021-03-08 RX ORDER — LORAZEPAM 0.5 MG/1
0.5 TABLET ORAL EVERY 6 HOURS PRN
Qty: 30 TABLET | Refills: 0 | Status: SHIPPED | OUTPATIENT
Start: 2021-03-08 | End: 2021-07-30

## 2021-03-08 RX ORDER — SERTRALINE HYDROCHLORIDE 50 MG/1
50 TABLET, FILM COATED ORAL DAILY
Qty: 30 TABLET | Refills: 0 | Status: SHIPPED | OUTPATIENT
Start: 2021-03-08 | End: 2021-04-09 | Stop reason: SDUPTHER

## 2021-04-28 ENCOUNTER — PATIENT MESSAGE (OUTPATIENT)
Dept: RESEARCH | Facility: HOSPITAL | Age: 41
End: 2021-04-28

## 2021-07-13 DIAGNOSIS — I10 ESSENTIAL HYPERTENSION: Primary | ICD-10-CM

## 2021-07-30 ENCOUNTER — OFFICE VISIT (OUTPATIENT)
Dept: INTERNAL MEDICINE | Facility: CLINIC | Age: 41
End: 2021-07-30
Payer: COMMERCIAL

## 2021-07-30 ENCOUNTER — PATIENT MESSAGE (OUTPATIENT)
Dept: INTERNAL MEDICINE | Facility: CLINIC | Age: 41
End: 2021-07-30

## 2021-07-30 DIAGNOSIS — I10 ESSENTIAL HYPERTENSION: ICD-10-CM

## 2021-07-30 DIAGNOSIS — R73.03 PRE-DIABETES: ICD-10-CM

## 2021-07-30 DIAGNOSIS — F41.1 GAD (GENERALIZED ANXIETY DISORDER): ICD-10-CM

## 2021-07-30 DIAGNOSIS — U07.1 COVID-19: Primary | ICD-10-CM

## 2021-07-30 PROCEDURE — 1159F MED LIST DOCD IN RCRD: CPT | Mod: CPTII,,, | Performed by: FAMILY MEDICINE

## 2021-07-30 PROCEDURE — 99214 OFFICE O/P EST MOD 30 MIN: CPT | Mod: 95,,, | Performed by: FAMILY MEDICINE

## 2021-07-30 PROCEDURE — 1160F PR REVIEW ALL MEDS BY PRESCRIBER/CLIN PHARMACIST DOCUMENTED: ICD-10-PCS | Mod: CPTII,,, | Performed by: FAMILY MEDICINE

## 2021-07-30 PROCEDURE — 1160F RVW MEDS BY RX/DR IN RCRD: CPT | Mod: CPTII,,, | Performed by: FAMILY MEDICINE

## 2021-07-30 PROCEDURE — 1159F PR MEDICATION LIST DOCUMENTED IN MEDICAL RECORD: ICD-10-PCS | Mod: CPTII,,, | Performed by: FAMILY MEDICINE

## 2021-07-30 PROCEDURE — 99214 PR OFFICE/OUTPT VISIT, EST, LEVL IV, 30-39 MIN: ICD-10-PCS | Mod: 95,,, | Performed by: FAMILY MEDICINE

## 2021-07-30 RX ORDER — METFORMIN HYDROCHLORIDE 500 MG/1
500 TABLET ORAL 2 TIMES DAILY WITH MEALS
Qty: 180 TABLET | Refills: 1 | Status: SHIPPED | OUTPATIENT
Start: 2021-07-30 | End: 2023-01-27 | Stop reason: ALTCHOICE

## 2021-07-30 RX ORDER — SERTRALINE HYDROCHLORIDE 50 MG/1
50 TABLET, FILM COATED ORAL DAILY
Qty: 90 TABLET | Refills: 1 | Status: SHIPPED | OUTPATIENT
Start: 2021-07-30 | End: 2021-12-28 | Stop reason: SDUPTHER

## 2021-08-01 ENCOUNTER — INFUSION (OUTPATIENT)
Dept: INFECTIOUS DISEASES | Facility: HOSPITAL | Age: 41
End: 2021-08-01
Attending: FAMILY MEDICINE
Payer: COMMERCIAL

## 2021-08-01 VITALS
OXYGEN SATURATION: 95 % | RESPIRATION RATE: 20 BRPM | BODY MASS INDEX: 51.69 KG/M2 | WEIGHT: 315 LBS | DIASTOLIC BLOOD PRESSURE: 70 MMHG | SYSTOLIC BLOOD PRESSURE: 119 MMHG | TEMPERATURE: 101 F | HEART RATE: 90 BPM

## 2021-08-01 DIAGNOSIS — U07.1 COVID-19: Primary | ICD-10-CM

## 2021-08-01 PROCEDURE — 25000003 PHARM REV CODE 250: Performed by: INTERNAL MEDICINE

## 2021-08-01 PROCEDURE — M0243 CASIRIVI AND IMDEVI INFUSION: HCPCS | Performed by: INTERNAL MEDICINE

## 2021-08-01 PROCEDURE — 63600175 PHARM REV CODE 636 W HCPCS: Performed by: INTERNAL MEDICINE

## 2021-08-01 RX ORDER — EPINEPHRINE 0.3 MG/.3ML
0.3 INJECTION SUBCUTANEOUS
Status: ACTIVE | OUTPATIENT
Start: 2021-08-01

## 2021-08-01 RX ORDER — ACETAMINOPHEN 325 MG/1
650 TABLET ORAL ONCE AS NEEDED
Status: DISCONTINUED | OUTPATIENT
Start: 2021-08-01 | End: 2023-01-27

## 2021-08-01 RX ORDER — DIPHENHYDRAMINE HYDROCHLORIDE 50 MG/ML
25 INJECTION INTRAMUSCULAR; INTRAVENOUS ONCE AS NEEDED
Status: DISCONTINUED | OUTPATIENT
Start: 2021-08-01 | End: 2023-01-27

## 2021-08-01 RX ORDER — SODIUM CHLORIDE 0.9 % (FLUSH) 0.9 %
10 SYRINGE (ML) INJECTION
Status: DISCONTINUED | OUTPATIENT
Start: 2021-08-01 | End: 2023-01-27

## 2021-08-01 RX ORDER — ALBUTEROL SULFATE 90 UG/1
2 AEROSOL, METERED RESPIRATORY (INHALATION)
Status: DISCONTINUED | OUTPATIENT
Start: 2021-08-01 | End: 2023-01-27

## 2021-08-01 RX ORDER — ONDANSETRON 4 MG/1
4 TABLET, ORALLY DISINTEGRATING ORAL ONCE AS NEEDED
Status: DISCONTINUED | OUTPATIENT
Start: 2021-08-01 | End: 2023-01-27

## 2021-08-01 RX ADMIN — CASIRIVIMAB AND IMDEVIMAB 600 MG: 600; 600 INJECTION, SOLUTION, CONCENTRATE INTRAVENOUS at 08:08

## 2022-04-05 RX ORDER — HYDROCHLOROTHIAZIDE 12.5 MG/1
12.5 TABLET ORAL DAILY
Qty: 30 TABLET | Refills: 0 | Status: SHIPPED | OUTPATIENT
Start: 2022-04-05 | End: 2023-01-27

## 2022-04-05 NOTE — TELEPHONE ENCOUNTER
This patient hasn't seen you since 03/08/2021. He had Covid in 07/2022 and did a virtual visit. So I know to call him and get him scheduled, but do you want to give any meds in the mean time?

## 2022-06-21 ENCOUNTER — TELEPHONE (OUTPATIENT)
Dept: INTERNAL MEDICINE | Facility: CLINIC | Age: 42
End: 2022-06-21
Payer: COMMERCIAL

## 2022-06-21 NOTE — TELEPHONE ENCOUNTER
----- Message from Jose Alfredo Yu MD sent at 6/13/2022  5:05 PM CDT -----  Call patient to schedule appointment.

## 2022-07-27 ENCOUNTER — PATIENT MESSAGE (OUTPATIENT)
Dept: ADMINISTRATIVE | Facility: HOSPITAL | Age: 42
End: 2022-07-27
Payer: COMMERCIAL

## 2022-08-25 DIAGNOSIS — I10 ESSENTIAL HYPERTENSION: ICD-10-CM

## 2022-09-07 ENCOUNTER — PATIENT OUTREACH (OUTPATIENT)
Dept: ADMINISTRATIVE | Facility: HOSPITAL | Age: 42
End: 2022-09-07
Payer: COMMERCIAL

## 2022-09-07 NOTE — PROGRESS NOTES
Attempted to contact patient by phone for PCP visit, was able to speak to patient. Patient will schedule at end of year.

## 2022-12-30 ENCOUNTER — PATIENT OUTREACH (OUTPATIENT)
Dept: ADMINISTRATIVE | Facility: HOSPITAL | Age: 42
End: 2022-12-30
Payer: COMMERCIAL

## 2022-12-30 NOTE — PROGRESS NOTES
NOT SEEN IN 12 MONTHS REPORT:      Researching chart of patient regarding overdue Annual and Health Maintenance.   Patient is scheduled with Dr. Lira on 1/27/23.        Care Everywhere reconciled.  Immunizations reconciled.

## 2023-01-27 ENCOUNTER — LAB VISIT (OUTPATIENT)
Dept: LAB | Facility: HOSPITAL | Age: 43
End: 2023-01-27
Attending: FAMILY MEDICINE
Payer: COMMERCIAL

## 2023-01-27 ENCOUNTER — OFFICE VISIT (OUTPATIENT)
Dept: PRIMARY CARE CLINIC | Facility: CLINIC | Age: 43
End: 2023-01-27
Payer: COMMERCIAL

## 2023-01-27 VITALS
RESPIRATION RATE: 15 BRPM | WEIGHT: 315 LBS | HEART RATE: 86 BPM | OXYGEN SATURATION: 98 % | DIASTOLIC BLOOD PRESSURE: 84 MMHG | SYSTOLIC BLOOD PRESSURE: 132 MMHG | HEIGHT: 69 IN | TEMPERATURE: 98 F | BODY MASS INDEX: 46.65 KG/M2

## 2023-01-27 DIAGNOSIS — R73.03 PREDIABETES: ICD-10-CM

## 2023-01-27 DIAGNOSIS — I10 PRIMARY HYPERTENSION: ICD-10-CM

## 2023-01-27 DIAGNOSIS — E66.01 MORBID OBESITY WITH BMI OF 50.0-59.9, ADULT: ICD-10-CM

## 2023-01-27 DIAGNOSIS — Z76.89 ENCOUNTER TO ESTABLISH CARE: Primary | ICD-10-CM

## 2023-01-27 DIAGNOSIS — K21.9 HIATAL HERNIA WITH GASTROESOPHAGEAL REFLUX: ICD-10-CM

## 2023-01-27 DIAGNOSIS — J45.30 MILD PERSISTENT ASTHMA WITHOUT COMPLICATION: ICD-10-CM

## 2023-01-27 DIAGNOSIS — Z76.89 ENCOUNTER TO ESTABLISH CARE: ICD-10-CM

## 2023-01-27 DIAGNOSIS — K44.9 HIATAL HERNIA WITH GASTROESOPHAGEAL REFLUX: ICD-10-CM

## 2023-01-27 LAB
BASOPHILS # BLD AUTO: 0.17 K/UL (ref 0–0.2)
BASOPHILS NFR BLD: 2 % (ref 0–1.9)
DIFFERENTIAL METHOD: ABNORMAL
EOSINOPHIL # BLD AUTO: 0.8 K/UL (ref 0–0.5)
EOSINOPHIL NFR BLD: 9 % (ref 0–8)
ERYTHROCYTE [DISTWIDTH] IN BLOOD BY AUTOMATED COUNT: 13.2 % (ref 11.5–14.5)
ESTIMATED AVG GLUCOSE: 120 MG/DL (ref 68–131)
HBA1C MFR BLD: 5.8 % (ref 4–5.6)
HCT VFR BLD AUTO: 46.9 % (ref 40–54)
HGB BLD-MCNC: 15.2 G/DL (ref 14–18)
IMM GRANULOCYTES # BLD AUTO: 0.01 K/UL (ref 0–0.04)
IMM GRANULOCYTES NFR BLD AUTO: 0.1 % (ref 0–0.5)
LYMPHOCYTES # BLD AUTO: 3 K/UL (ref 1–4.8)
LYMPHOCYTES NFR BLD: 34.9 % (ref 18–48)
MCH RBC QN AUTO: 29.3 PG (ref 27–31)
MCHC RBC AUTO-ENTMCNC: 32.4 G/DL (ref 32–36)
MCV RBC AUTO: 91 FL (ref 82–98)
MONOCYTES # BLD AUTO: 0.9 K/UL (ref 0.3–1)
MONOCYTES NFR BLD: 10.7 % (ref 4–15)
NEUTROPHILS # BLD AUTO: 3.7 K/UL (ref 1.8–7.7)
NEUTROPHILS NFR BLD: 43.3 % (ref 38–73)
NRBC BLD-RTO: 0 /100 WBC
PLATELET # BLD AUTO: 403 K/UL (ref 150–450)
PMV BLD AUTO: 10.3 FL (ref 9.2–12.9)
RBC # BLD AUTO: 5.18 M/UL (ref 4.6–6.2)
WBC # BLD AUTO: 8.48 K/UL (ref 3.9–12.7)

## 2023-01-27 PROCEDURE — 36415 COLL VENOUS BLD VENIPUNCTURE: CPT | Mod: PN | Performed by: FAMILY MEDICINE

## 2023-01-27 PROCEDURE — 1159F MED LIST DOCD IN RCRD: CPT | Mod: CPTII,S$GLB,, | Performed by: FAMILY MEDICINE

## 2023-01-27 PROCEDURE — 84443 ASSAY THYROID STIM HORMONE: CPT | Performed by: FAMILY MEDICINE

## 2023-01-27 PROCEDURE — 3008F BODY MASS INDEX DOCD: CPT | Mod: CPTII,S$GLB,, | Performed by: FAMILY MEDICINE

## 2023-01-27 PROCEDURE — 3079F DIAST BP 80-89 MM HG: CPT | Mod: CPTII,S$GLB,, | Performed by: FAMILY MEDICINE

## 2023-01-27 PROCEDURE — 83036 HEMOGLOBIN GLYCOSYLATED A1C: CPT | Performed by: FAMILY MEDICINE

## 2023-01-27 PROCEDURE — 80053 COMPREHEN METABOLIC PANEL: CPT | Performed by: FAMILY MEDICINE

## 2023-01-27 PROCEDURE — 99999 PR PBB SHADOW E&M-EST. PATIENT-LVL V: CPT | Mod: PBBFAC,,, | Performed by: FAMILY MEDICINE

## 2023-01-27 PROCEDURE — 3008F PR BODY MASS INDEX (BMI) DOCUMENTED: ICD-10-PCS | Mod: CPTII,S$GLB,, | Performed by: FAMILY MEDICINE

## 2023-01-27 PROCEDURE — 99215 PR OFFICE/OUTPT VISIT, EST, LEVL V, 40-54 MIN: ICD-10-PCS | Mod: S$GLB,,, | Performed by: FAMILY MEDICINE

## 2023-01-27 PROCEDURE — 80061 LIPID PANEL: CPT | Performed by: FAMILY MEDICINE

## 2023-01-27 PROCEDURE — 1160F PR REVIEW ALL MEDS BY PRESCRIBER/CLIN PHARMACIST DOCUMENTED: ICD-10-PCS | Mod: CPTII,S$GLB,, | Performed by: FAMILY MEDICINE

## 2023-01-27 PROCEDURE — 3079F PR MOST RECENT DIASTOLIC BLOOD PRESSURE 80-89 MM HG: ICD-10-PCS | Mod: CPTII,S$GLB,, | Performed by: FAMILY MEDICINE

## 2023-01-27 PROCEDURE — 85025 COMPLETE CBC W/AUTO DIFF WBC: CPT | Performed by: FAMILY MEDICINE

## 2023-01-27 PROCEDURE — 3075F PR MOST RECENT SYSTOLIC BLOOD PRESS GE 130-139MM HG: ICD-10-PCS | Mod: CPTII,S$GLB,, | Performed by: FAMILY MEDICINE

## 2023-01-27 PROCEDURE — 99215 OFFICE O/P EST HI 40 MIN: CPT | Mod: S$GLB,,, | Performed by: FAMILY MEDICINE

## 2023-01-27 PROCEDURE — 1159F PR MEDICATION LIST DOCUMENTED IN MEDICAL RECORD: ICD-10-PCS | Mod: CPTII,S$GLB,, | Performed by: FAMILY MEDICINE

## 2023-01-27 PROCEDURE — 1160F RVW MEDS BY RX/DR IN RCRD: CPT | Mod: CPTII,S$GLB,, | Performed by: FAMILY MEDICINE

## 2023-01-27 PROCEDURE — 99999 PR PBB SHADOW E&M-EST. PATIENT-LVL V: ICD-10-PCS | Mod: PBBFAC,,, | Performed by: FAMILY MEDICINE

## 2023-01-27 PROCEDURE — 3075F SYST BP GE 130 - 139MM HG: CPT | Mod: CPTII,S$GLB,, | Performed by: FAMILY MEDICINE

## 2023-01-27 RX ORDER — DUPILUMAB 300 MG/2ML
INJECTION, SOLUTION SUBCUTANEOUS
COMMUNITY
Start: 2022-12-29

## 2023-01-27 RX ORDER — FLUTICASONE PROPIONATE 93 UG/1
SPRAY, METERED NASAL
COMMUNITY
Start: 2023-01-26

## 2023-01-27 RX ORDER — SEMAGLUTIDE 0.25 MG/.5ML
0.25 INJECTION, SOLUTION SUBCUTANEOUS
Qty: 2 ML | Refills: 1 | Status: SHIPPED | OUTPATIENT
Start: 2023-01-27 | End: 2023-02-03 | Stop reason: ALTCHOICE

## 2023-01-27 NOTE — PATIENT INSTRUCTIONS
Physically, everything looks really good today.      Screening blood work done today.  Results will be posted to [facility] as soon as they are available.      Assuming all of the blood work comes back okay, we are good to start Wegovy to help with weight loss.  Prescription sent to pharmacy today.      When you start it, you will do 0.25 mg weekly for the 1st four weeks.  After you do the 3rd injection, let me know how you are doing.  If you are tolerating it fine, I will send a new prescription for the 0.5 mg weekly dose.  We can stay at that dose for 4-8 weeks at a minimum.  From there, we can decide if or when we need to increase the dose again.      Continue to eat a healthy diet.  Be careful with portion sizes.  Includes lots of fresh fruits, vegetables, whole grains, lean proteins.  See info below.    Keep hydrated.  Be sure to drink at least 8-10, 8 oz, glasses of water every day.    Stay active.  Try to do some sort of physical activity every day.  Nothing outrageous, just try walking for 10-15 minutes each day.     Blood pressure is a bit higher than we would like today.  We will recheck it before you go.  If still elevated, I will ask you to send me copies of your readings taken at home.  Would like you to check your pressure two or 3 times per week, at random times.  When you check it, be sure to sit and rest for 3-5 minutes, then take a reading.  Record the date, time, blood pressure, and heart rate.  If we see the top number consistently 140 or above, or the bottom number consistently 90 or above, we may want to consider restarting blood pressure medications.      That said, I do think that continued weight loss will help your blood pressure.

## 2023-01-27 NOTE — PROGRESS NOTES
"    [facility]       [address] [place] [address]      Phone: [phone]      Fax: [phone]    Loy Lira MD                Office Visit  01/27/2023        Subjective      HPI:  Basilio Jain is a 42 y.o. male presents today in clinic for "Establish Care  ."     42-year-old gentleman presents today to establish care, discuss multiple issues.      Patient states that this office is very close to his home.  Would be more convenient to come here.      He states that, overall, he feels okay today.  No chest pain, shortness a breath.  No fever, chills, body aches.  No coughing, sneezing, URI type symptoms.  States appetite is normal.  States bowel movements are normal.  No urinary issues.      States that he has hypertension.  Not currently taking any medications for it.  In the past was given prescriptions for losartan, but it caused some swelling.  Was changed to HCTZ, which worked okay, but when it ran out he discontinued it.  Was trying to focus on weight loss.  States that his blood pressure at home typically runs 110-120/83-90 in the mornings.  Always well controlled at home.      Also has issues with asthma.  Currently being managed by his ENT.  States that a while back, he was having significant allergy issues.  Had some coughing, phlegm.  Saw the ENT, wound up having sinus surgery.  After the surgery, he developed some nasal polyps.  Had these removed recently.  The procedure to remove the polyps seem to flare-up is asthma symptoms.  He was restarted on Dupixent.  In the process of trying to wean himself off.  Skipped his last monthly injection and notice that his breathing issues started to flare up a bit.  Has been using albuterol two or 3 times per week.  Also supplements with Xyzal, as needed.  Has a compound id spray that he also use.    Was diagnosed with prediabetes when his A1c ran up to around 5.9% in the past.  States blood sugars at " home tend to run about  in the mornings, fasting.  Approximately 115-120 after meals.    His biggest challenges his weight right now.  After his surgery, he was very sedentary and weight ballooned up to 405 lb.  Has been really focused on diet, exercise, making lifestyle changes.  He tracks is calories, macro nutrients regularly.  Unfortunately, he does tend to stress eat.  Also, will go all day without eating, then have a big meal when he gets home.  Will also snack in the evening.  Not opposed to using medication to help with weight loss.    PMH:  HTN.  Pre DM.  Asthma.  Hiatal hernia/GERD.    PSH: Nasal surgery.  EGD.    F MH:  Obesity.  DM.  Grandmother had unknown cancer.    Allergies:  PCN-as a child.  Aspirin caused eye swelling.  Doxycycline caused hives.  Losartan made his feet and legs swell.  Social:  Works in [employer].  Owns two day cares.  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    T: Denies   A: Occasionally/rarely   D:  Denies    Exercise:  Walks on a treadmill 2-3 times per week    ENT:  Dr. Fabio Garza      The following were updated and reviewed by myself in the chart: medications, past medical history, past surgical history, family history, social history, and allergies.     Medications:  Current Outpatient Medications on File Prior to Visit   Medication Sig Dispense Refill    DUPIXENT  mg/2 mL PnIj Inject into the skin.      XHANCE 93 mcg/actuation AerB by Each Nostril route.      [DISCONTINUED] ergocalciferol, vitamin D2, (VITAMIN D ORAL) Take by mouth.      [DISCONTINUED] VITAMIN E ACETATE ORAL Take by mouth.      [DISCONTINUED] famotidine (PEPCID ORAL) Take by mouth.      [DISCONTINUED] hydroCHLOROthiazide (HYDRODIURIL) 12.5 MG Tab Take 1 tablet (12.5 mg total) by mouth once daily. (Patient not taking: Reported on 1/27/2023) 30 tablet 0    [DISCONTINUED] metFORMIN (GLUCOPHAGE) 500 MG tablet Take 1 tablet (500 mg total) by mouth 2 (two) times daily with meals.  180 tablet 1    [DISCONTINUED] NEILMED SINUS RINSE COMPLETE pkdv use as directed      [DISCONTINUED] sertraline (ZOLOFT) 50 MG tablet Take 1 tablet (50 mg total) by mouth once daily. 90 tablet 0     Current Facility-Administered Medications on File Prior to Visit   Medication Dose Route Frequency Provider Last Rate Last Admin    EPINEPHrine (EPIPEN) 0.3 mg/0.3 mL pen injection 0.3 mg  0.3 mg Intramuscular PRN Jarod Wilson MD        [DISCONTINUED] acetaminophen tablet 650 mg  650 mg Oral Once PRN Jarod Wilson MD        [DISCONTINUED] albuterol inhaler 2 puff  2 puff Inhalation Q20 Min PRN Jarod Wilson MD        [DISCONTINUED] diphenhydrAMINE injection 25 mg  25 mg Intravenous Once PRN Jarod Wilson MD        [DISCONTINUED] methylPREDNISolone sodium succinate injection 40 mg  40 mg Intravenous Once PRN Jarod Wilson MD        [DISCONTINUED] ondansetron disintegrating tablet 4 mg  4 mg Oral Once PRN Jarod Wilson MD        [DISCONTINUED] sodium chloride 0.9% 500 mL flush bag   Intravenous PRN Jarod Wilson MD        [DISCONTINUED] sodium chloride 0.9% flush 10 mL  10 mL Intravenous PRN Jarod Wilson MD            PMHx:  Past Medical History:   Diagnosis Date    Asthma     Sleep apnea, unspecified       Patient Active Problem List    Diagnosis Date Noted    COVID-19 07/30/2021    ALDO (generalized anxiety disorder) 03/08/2021    Status post nasal surgery 03/05/2021    Immunization deficiency 01/05/2021    Pre-diabetes 01/05/2021    Essential hypertension 12/29/2020    Snores 12/29/2020    Morbid obesity with BMI of 50.0-59.9, adult 01/03/2018        PSHx:  Past Surgical History:   Procedure Laterality Date    EYE SURGERY  2004    Lasik    HERNIA REPAIR      NASAL SEPTOPLASTY  2021    WISDOM TOOTH EXTRACTION      X 4        FHx:  Family History   Problem Relation Age of Onset    Hypertension Mother     Atrial fibrillation Mother     Hypothyroidism Mother     Diabetes Mother      "Hypertension Father     ADD / ADHD Sister         Social:  Social History     Socioeconomic History    Marital status:    Tobacco Use    Smoking status: Never    Smokeless tobacco: Never   Substance and Sexual Activity    Alcohol use: Yes     Comment: Occasional    Drug use: No    Sexual activity: Yes     Partners: Female        Allergies:  Review of patient's allergies indicates:   Allergen Reactions    Asa [aspirin] Swelling    Doxycycline Hives and Rash    Losartan Edema    Pcn [penicillins] Anaphylaxis        ROS:  Review of Systems   Constitutional:  Negative for activity change, appetite change, chills and fever.   HENT:  Negative for congestion, postnasal drip, rhinorrhea, sore throat and trouble swallowing.    Respiratory:  Negative for cough and shortness of breath.    Cardiovascular:  Negative for chest pain and palpitations.   Gastrointestinal:  Negative for abdominal pain, constipation, diarrhea, nausea and vomiting.   Genitourinary:  Negative for difficulty urinating.   Musculoskeletal:  Negative for arthralgias and myalgias.   Skin:  Negative for color change and rash.   Neurological:  Negative for headaches.   All other systems reviewed and are negative.       Objective      /84   Pulse 86   Temp 98.1 °F (36.7 °C) (Temporal)   Resp 15   Ht 5' 9" (1.753 m)   Wt (!) 168.1 kg (370 lb 9.5 oz)   SpO2 98%   BMI 54.73 kg/m²   Ht Readings from Last 3 Encounters:   01/27/23 5' 9" (1.753 m)   02/02/21 5' 9" (1.753 m)   01/14/21 5' 9" (1.753 m)     Wt Readings from Last 3 Encounters:   01/27/23 (!) 168.1 kg (370 lb 9.5 oz)   08/01/21 (!) 158.8 kg (350 lb)   02/02/21 (!) 170.4 kg (375 lb 10.6 oz)       PHYSICAL EXAM:  Physical Exam  Vitals and nursing note reviewed.   Constitutional:       General: He is not in acute distress.     Appearance: Normal appearance.   HENT:      Head: Normocephalic and atraumatic.      Right Ear: Tympanic membrane, ear canal and external ear normal.      Left Ear: " Tympanic membrane, ear canal and external ear normal.      Nose: Nose normal. No congestion or rhinorrhea.      Mouth/Throat:      Mouth: Mucous membranes are moist.      Pharynx: Oropharynx is clear. No oropharyngeal exudate or posterior oropharyngeal erythema.   Eyes:      Extraocular Movements: Extraocular movements intact.      Conjunctiva/sclera: Conjunctivae normal.      Pupils: Pupils are equal, round, and reactive to light.   Cardiovascular:      Rate and Rhythm: Normal rate and regular rhythm.   Pulmonary:      Effort: Pulmonary effort is normal.      Breath sounds: No wheezing, rhonchi or rales.   Musculoskeletal:         General: Normal range of motion.      Cervical back: Normal range of motion.   Lymphadenopathy:      Cervical: No cervical adenopathy.   Skin:     General: Skin is warm and dry.   Neurological:      General: No focal deficit present.      Mental Status: He is alert.            LABS / IMAGING:  No results found for this or any previous visit (from the past 4368 hour(s)).      Assessment    1. Encounter to establish care    2. Primary hypertension    3. Prediabetes    4. Mild persistent asthma without complication    5. Hiatal hernia with gastroesophageal reflux    6. Morbid obesity with BMI of 50.0-59.9, adult          Plan    Basilio was seen today for establish care.    Diagnoses and all orders for this visit:    Encounter to establish care  -     CBC Auto Differential; Future  -     Comprehensive Metabolic Panel; Future  -     TSH; Future  -     Lipid Panel; Future  -     Hemoglobin A1C; Future    Primary hypertension  -     CBC Auto Differential; Future  -     Comprehensive Metabolic Panel; Future  -     TSH; Future  -     Lipid Panel; Future    Prediabetes  -     Hemoglobin A1C; Future    Mild persistent asthma without complication    Hiatal hernia with gastroesophageal reflux    Morbid obesity with BMI of 50.0-59.9, adult  -     CBC Auto Differential; Future  -     Comprehensive  Metabolic Panel; Future  -     TSH; Future  -     Lipid Panel; Future  -     Hemoglobin A1C; Future  -     semaglutide, weight loss, (WEGOVY) 0.25 mg/0.5 mL PnIj; Inject 0.25 mg into the skin every 7 days.      Physically, everything looks good today.      Will get some screening labs, as above.      Continue with diet, exercise.  Recommended referral to lifestyle and wellness for nutritional advice, dietitian.  Okay to start on Wegovy, if covered under his insurance.    FOLLOW-UP:  Follow up in about 3 months (around 4/27/2023) for recheck.    I spent a total of 45 minutes face to face and non-face to face on the date of this visit.This includes time preparing to see the patient (eg, review of tests, notes), obtaining and/or reviewing additional history from an independent historian and/or outside medical records, documenting clinical information in the electronic health record, independently interpreting results and/or communicating results to the patient/family/caregiver, or care coordinator.    Signed by:  Loy Lira MD

## 2023-01-28 LAB
ALBUMIN SERPL BCP-MCNC: 4.2 G/DL (ref 3.5–5.2)
ALP SERPL-CCNC: 68 U/L (ref 55–135)
ALT SERPL W/O P-5'-P-CCNC: 36 U/L (ref 10–44)
ANION GAP SERPL CALC-SCNC: 11 MMOL/L (ref 8–16)
AST SERPL-CCNC: 23 U/L (ref 10–40)
BILIRUB SERPL-MCNC: 0.5 MG/DL (ref 0.1–1)
BUN SERPL-MCNC: 16 MG/DL (ref 6–20)
CALCIUM SERPL-MCNC: 9.9 MG/DL (ref 8.7–10.5)
CHLORIDE SERPL-SCNC: 105 MMOL/L (ref 95–110)
CHOLEST SERPL-MCNC: 127 MG/DL (ref 120–199)
CHOLEST/HDLC SERPL: 3.3 {RATIO} (ref 2–5)
CO2 SERPL-SCNC: 23 MMOL/L (ref 23–29)
CREAT SERPL-MCNC: 1 MG/DL (ref 0.5–1.4)
EST. GFR  (NO RACE VARIABLE): >60 ML/MIN/1.73 M^2
GLUCOSE SERPL-MCNC: 91 MG/DL (ref 70–110)
HDLC SERPL-MCNC: 38 MG/DL (ref 40–75)
HDLC SERPL: 29.9 % (ref 20–50)
LDLC SERPL CALC-MCNC: 74.6 MG/DL (ref 63–159)
NONHDLC SERPL-MCNC: 89 MG/DL
POTASSIUM SERPL-SCNC: 4.3 MMOL/L (ref 3.5–5.1)
PROT SERPL-MCNC: 8.1 G/DL (ref 6–8.4)
SODIUM SERPL-SCNC: 139 MMOL/L (ref 136–145)
TRIGL SERPL-MCNC: 72 MG/DL (ref 30–150)
TSH SERPL DL<=0.005 MIU/L-ACNC: 2.08 UIU/ML (ref 0.4–4)

## 2023-01-30 ENCOUNTER — PATIENT MESSAGE (OUTPATIENT)
Dept: PRIMARY CARE CLINIC | Facility: CLINIC | Age: 43
End: 2023-01-30
Payer: COMMERCIAL

## 2023-01-30 NOTE — PROGRESS NOTES
Mr. Richmond,     You should be able to see the results of your recent blood work in [facility].  Overall, everything looks pretty good.      Blood counts all look fine.  Electrolytes, kidney function, liver function, and thyroid function were all normal.    Cholesterol level looks great.  Total cholesterol was 127.  Anything under 200 is normal.  Triglycerides also look good at 72.  Would like these under 150.  HDL, or good cholesterol, was a little low at 38.  Ideally, would like this over 40.  LDL, or bad cholesterol, was perfect at 74.  Anything under 100 is great.    Your A1c was slightly elevated at 5.8%, but still better than it was two years ago when it was 5.9%.  As we discussed, this does place you one step in the prediabetic range, but you are still a long way from becoming full diabetic.  With diet, exercise, and weight loss, this number will come down very easily.    Were you able to fill the prescription?

## 2023-01-31 NOTE — TELEPHONE ENCOUNTER
----- Message from Loy Lira MD sent at 1/31/2023  4:10 PM CST -----  Yes. Waiting for response.    ----- Message -----  From: Coby Eduardo MA  Sent: 1/31/2023   1:48 PM CST  To: Loy Lira MD    Have you done PA for pt?    ----- Message -----  From: Samantha Lombardo  Sent: 1/31/2023  11:58 AM CST  To: Pankaj Granado Staff    Please call pt @ [phone] regarding medication, p0t need to know if the prior authorization was done for med WEGOVY

## 2023-02-03 RX ORDER — SEMAGLUTIDE 1.34 MG/ML
INJECTION, SOLUTION SUBCUTANEOUS
Qty: 1 PEN | Refills: 0 | Status: SHIPPED | OUTPATIENT
Start: 2023-02-03 | End: 2023-03-07 | Stop reason: SDUPTHER

## 2023-03-03 ENCOUNTER — PATIENT MESSAGE (OUTPATIENT)
Dept: PRIMARY CARE CLINIC | Facility: CLINIC | Age: 43
End: 2023-03-03
Payer: COMMERCIAL

## 2023-04-27 ENCOUNTER — OFFICE VISIT (OUTPATIENT)
Dept: PRIMARY CARE CLINIC | Facility: CLINIC | Age: 43
End: 2023-04-27
Payer: COMMERCIAL

## 2023-04-27 VITALS
BODY MASS INDEX: 46.65 KG/M2 | WEIGHT: 315 LBS | HEIGHT: 69 IN | RESPIRATION RATE: 17 BRPM | OXYGEN SATURATION: 97 % | HEART RATE: 91 BPM | DIASTOLIC BLOOD PRESSURE: 82 MMHG | TEMPERATURE: 99 F | SYSTOLIC BLOOD PRESSURE: 132 MMHG

## 2023-04-27 DIAGNOSIS — R73.03 PREDIABETES: ICD-10-CM

## 2023-04-27 DIAGNOSIS — I10 PRIMARY HYPERTENSION: Primary | ICD-10-CM

## 2023-04-27 PROCEDURE — 99999 PR PBB SHADOW E&M-EST. PATIENT-LVL V: ICD-10-PCS | Mod: PBBFAC,,, | Performed by: FAMILY MEDICINE

## 2023-04-27 PROCEDURE — 99214 PR OFFICE/OUTPT VISIT, EST, LEVL IV, 30-39 MIN: ICD-10-PCS | Mod: S$GLB,,, | Performed by: FAMILY MEDICINE

## 2023-04-27 PROCEDURE — 99999 PR PBB SHADOW E&M-EST. PATIENT-LVL V: CPT | Mod: PBBFAC,,, | Performed by: FAMILY MEDICINE

## 2023-04-27 PROCEDURE — 3075F SYST BP GE 130 - 139MM HG: CPT | Mod: CPTII,S$GLB,, | Performed by: FAMILY MEDICINE

## 2023-04-27 PROCEDURE — 3079F PR MOST RECENT DIASTOLIC BLOOD PRESSURE 80-89 MM HG: ICD-10-PCS | Mod: CPTII,S$GLB,, | Performed by: FAMILY MEDICINE

## 2023-04-27 PROCEDURE — 3044F PR MOST RECENT HEMOGLOBIN A1C LEVEL <7.0%: ICD-10-PCS | Mod: CPTII,S$GLB,, | Performed by: FAMILY MEDICINE

## 2023-04-27 PROCEDURE — 3008F BODY MASS INDEX DOCD: CPT | Mod: CPTII,S$GLB,, | Performed by: FAMILY MEDICINE

## 2023-04-27 PROCEDURE — 3044F HG A1C LEVEL LT 7.0%: CPT | Mod: CPTII,S$GLB,, | Performed by: FAMILY MEDICINE

## 2023-04-27 PROCEDURE — 1159F MED LIST DOCD IN RCRD: CPT | Mod: CPTII,S$GLB,, | Performed by: FAMILY MEDICINE

## 2023-04-27 PROCEDURE — 3075F PR MOST RECENT SYSTOLIC BLOOD PRESS GE 130-139MM HG: ICD-10-PCS | Mod: CPTII,S$GLB,, | Performed by: FAMILY MEDICINE

## 2023-04-27 PROCEDURE — 3008F PR BODY MASS INDEX (BMI) DOCUMENTED: ICD-10-PCS | Mod: CPTII,S$GLB,, | Performed by: FAMILY MEDICINE

## 2023-04-27 PROCEDURE — 99214 OFFICE O/P EST MOD 30 MIN: CPT | Mod: S$GLB,,, | Performed by: FAMILY MEDICINE

## 2023-04-27 PROCEDURE — 3079F DIAST BP 80-89 MM HG: CPT | Mod: CPTII,S$GLB,, | Performed by: FAMILY MEDICINE

## 2023-04-27 PROCEDURE — 1159F PR MEDICATION LIST DOCUMENTED IN MEDICAL RECORD: ICD-10-PCS | Mod: CPTII,S$GLB,, | Performed by: FAMILY MEDICINE

## 2023-04-27 RX ORDER — SEMAGLUTIDE 1.34 MG/ML
1 INJECTION, SOLUTION SUBCUTANEOUS
Qty: 9 ML | Refills: 3 | Status: SHIPPED | OUTPATIENT
Start: 2023-04-27 | End: 2023-05-08 | Stop reason: SDUPTHER

## 2023-04-27 NOTE — PATIENT INSTRUCTIONS
Physically, everything looks great today!     Congratulations on the weight loss! You are doing quite well.      Let us go ahead and increase the dose of Ozempic to 1 mg weekly.  New prescription sent to pharmacy today.      Continue to eat a healthy diet.  Be careful with portion sizes.  Includes lots of fresh fruits, vegetables, whole grains, lean proteins.  See info below.    Keep hydrated.  Be sure to drink at least 8-10, 8 oz, glasses of water every day.    Stay active.  Try to do some sort of physical activity every day.  Nothing outrageous, just try walking for 10-15 minutes each day.

## 2023-04-27 NOTE — PROGRESS NOTES
"    [facility]       [address] [place] [address]      Phone: [phone]      Fax: [phone]    Loy Lira MD                Office Visit  04/27/2023        Subjective      HPI:  Basilio Jain is a 42 y.o. male presents today in clinic for "Follow-up (3 month follow up)  ."     42-year-old gentleman presents today to follow-up on multiple issues.      He states that, overall, he feels okay today.  No chest pain, shortness a breath.  No fever, chills, body aches.  No coughing, sneezing, URI type symptoms.  States appetite is normal.  States bowel movements are normal.  No urinary issues.      Has hypertension.  Still not on any medications.  Working on diet, exercise, weight loss.  Blood pressure generally well controlled at home.    Also has issues with asthma.  Currently being managed by his ENT.  Takes Dupixent regularly.  Also supplements with Xyzal, as needed.  Has a compounded spray that he also uses.    Has prediabetes.  Last A1c was 5.8%.  Trying to watch diet, portion size.  Tracks is foods daily.  Has been using Ozempic 0.5 mg weekly.  Doing very well with it.  Initially, it would suppress cravings, but over the last couple of weeks they have started coming back.  Initially, he also was bloated, gassy at night.  He stop taking sodas and started using some OTC digestive enzymes, which helped with those symptoms.  Has lost about 35 lb since last visit.    PMH:  HTN.  Pre DM.  Asthma.  Hiatal hernia/GERD.    PSH: Nasal surgery.  EGD.    F MH:  Obesity.  DM.  Grandmother had unknown cancer.    Allergies:  PCN-as a child.  Aspirin caused eye swelling.  Doxycycline caused hives.  Losartan made his feet and legs swell.  Social:  Works in [employer]/Education.  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    T: Denies   A: Occasionally/rarely   D:  Denies    Exercise:  Walks on a treadmill 2-3 times per week    ENT:  Dr. Mccarthy " Greg      The following were updated and reviewed by myself in the chart: medications, past medical history, past surgical history, family history, social history, and allergies.     Medications:  Current Outpatient Medications on File Prior to Visit   Medication Sig Dispense Refill    DUPIXENT  mg/2 mL PnIj Inject into the skin.      semaglutide (OZEMPIC) 0.25 mg or 0.5 mg(2 mg/1.5 mL) pen injector Inject 0.5 mg into the skin every 7 days. 3 pen 0    XHANCE 93 mcg/actuation AerB by Each Nostril route.       Current Facility-Administered Medications on File Prior to Visit   Medication Dose Route Frequency Provider Last Rate Last Admin    EPINEPHrine (EPIPEN) 0.3 mg/0.3 mL pen injection 0.3 mg  0.3 mg Intramuscular PRN Jarod Wilson MD            PMHx:  Past Medical History:   Diagnosis Date    Asthma     Sleep apnea, unspecified       Patient Active Problem List    Diagnosis Date Noted    COVID-19 07/30/2021    ALDO (generalized anxiety disorder) 03/08/2021    Status post nasal surgery 03/05/2021    Immunization deficiency 01/05/2021    Pre-diabetes 01/05/2021    Essential hypertension 12/29/2020    Snores 12/29/2020    Morbid obesity with BMI of 50.0-59.9, adult 01/03/2018        PSHx:  Past Surgical History:   Procedure Laterality Date    EYE SURGERY  2004    Lasik    HERNIA REPAIR      NASAL SEPTOPLASTY  2021    WISDOM TOOTH EXTRACTION      X 4        FHx:  Family History   Problem Relation Age of Onset    Hypertension Mother     Atrial fibrillation Mother     Hypothyroidism Mother     Diabetes Mother     Hypertension Father     ADD / ADHD Sister         Social:  Social History     Socioeconomic History    Marital status:    Tobacco Use    Smoking status: Never    Smokeless tobacco: Never   Substance and Sexual Activity    Alcohol use: Yes     Comment: Occasional    Drug use: No    Sexual activity: Yes     Partners: Female     Birth control/protection: Other-see comments     Comment: Spouse had  "hysteroctomy        Allergies:  Review of patient's allergies indicates:   Allergen Reactions    Asa [aspirin] Swelling    Doxycycline Hives and Rash    Losartan Edema    Pcn [penicillins] Anaphylaxis        ROS:  Review of Systems   Constitutional:  Negative for activity change, appetite change, chills and fever.   HENT:  Negative for congestion, postnasal drip, rhinorrhea, sore throat and trouble swallowing.    Respiratory:  Negative for cough and shortness of breath.    Cardiovascular:  Negative for chest pain and palpitations.   Gastrointestinal:  Negative for abdominal pain, constipation, diarrhea, nausea and vomiting.   Genitourinary:  Negative for difficulty urinating.   Musculoskeletal:  Negative for arthralgias and myalgias.   Skin:  Negative for color change and rash.   Neurological:  Negative for headaches.   All other systems reviewed and are negative.       Objective      /82   Pulse 91   Temp 98.7 °F (37.1 °C) (Temporal)   Resp 17   Ht 5' 9" (1.753 m)   Wt (!) 151.5 kg (334 lb)   SpO2 97%   BMI 49.32 kg/m²   Ht Readings from Last 3 Encounters:   04/27/23 5' 9" (1.753 m)   01/27/23 5' 9" (1.753 m)   02/02/21 5' 9" (1.753 m)     Wt Readings from Last 3 Encounters:   04/27/23 (!) 151.5 kg (334 lb)   01/27/23 (!) 168.1 kg (370 lb 9.5 oz)   08/01/21 (!) 158.8 kg (350 lb)       PHYSICAL EXAM:  Physical Exam  Vitals and nursing note reviewed.   Constitutional:       General: He is not in acute distress.     Appearance: Normal appearance.   HENT:      Head: Normocephalic and atraumatic.      Right Ear: Tympanic membrane, ear canal and external ear normal.      Left Ear: Tympanic membrane, ear canal and external ear normal.      Nose: Nose normal. No congestion or rhinorrhea.      Mouth/Throat:      Mouth: Mucous membranes are moist.      Pharynx: Oropharynx is clear. No oropharyngeal exudate or posterior oropharyngeal erythema.   Eyes:      Extraocular Movements: Extraocular movements intact.      " Conjunctiva/sclera: Conjunctivae normal.      Pupils: Pupils are equal, round, and reactive to light.   Cardiovascular:      Rate and Rhythm: Normal rate and regular rhythm.   Pulmonary:      Effort: Pulmonary effort is normal.      Breath sounds: No wheezing, rhonchi or rales.   Musculoskeletal:         General: Normal range of motion.      Cervical back: Normal range of motion.   Lymphadenopathy:      Cervical: No cervical adenopathy.   Skin:     General: Skin is warm and dry.   Neurological:      General: No focal deficit present.      Mental Status: He is alert.            LABS / IMAGING:  Recent Results (from the past 4368 hour(s))   CBC Auto Differential    Collection Time: 01/27/23 11:00 AM   Result Value Ref Range    WBC 8.48 3.90 - 12.70 K/uL    RBC 5.18 4.60 - 6.20 M/uL    Hemoglobin 15.2 14.0 - 18.0 g/dL    Hematocrit 46.9 40.0 - 54.0 %    MCV 91 82 - 98 fL    MCH 29.3 27.0 - 31.0 pg    MCHC 32.4 32.0 - 36.0 g/dL    RDW 13.2 11.5 - 14.5 %    Platelets 403 150 - 450 K/uL    MPV 10.3 9.2 - 12.9 fL    Immature Granulocytes 0.1 0.0 - 0.5 %    Gran # (ANC) 3.7 1.8 - 7.7 K/uL    Immature Grans (Abs) 0.01 0.00 - 0.04 K/uL    Lymph # 3.0 1.0 - 4.8 K/uL    Mono # 0.9 0.3 - 1.0 K/uL    Eos # 0.8 (H) 0.0 - 0.5 K/uL    Baso # 0.17 0.00 - 0.20 K/uL    nRBC 0 0 /100 WBC    Gran % 43.3 38.0 - 73.0 %    Lymph % 34.9 18.0 - 48.0 %    Mono % 10.7 4.0 - 15.0 %    Eosinophil % 9.0 (H) 0.0 - 8.0 %    Basophil % 2.0 (H) 0.0 - 1.9 %    Differential Method Automated    Comprehensive Metabolic Panel    Collection Time: 01/27/23 11:00 AM   Result Value Ref Range    Sodium 139 136 - 145 mmol/L    Potassium 4.3 3.5 - 5.1 mmol/L    Chloride 105 95 - 110 mmol/L    CO2 23 23 - 29 mmol/L    Glucose 91 70 - 110 mg/dL    BUN 16 6 - 20 mg/dL    Creatinine 1.0 0.5 - 1.4 mg/dL    Calcium 9.9 8.7 - 10.5 mg/dL    Total Protein 8.1 6.0 - 8.4 g/dL    Albumin 4.2 3.5 - 5.2 g/dL    Total Bilirubin 0.5 0.1 - 1.0 mg/dL    Alkaline Phosphatase 68 55  - 135 U/L    AST 23 10 - 40 U/L    ALT 36 10 - 44 U/L    Anion Gap 11 8 - 16 mmol/L    eGFR >60.0 >60 mL/min/1.73 m^2   TSH    Collection Time: 01/27/23 11:00 AM   Result Value Ref Range    TSH 2.082 0.400 - 4.000 uIU/mL   Lipid Panel    Collection Time: 01/27/23 11:00 AM   Result Value Ref Range    Cholesterol 127 120 - 199 mg/dL    Triglycerides 72 30 - 150 mg/dL    HDL 38 (L) 40 - 75 mg/dL    LDL Cholesterol 74.6 63.0 - 159.0 mg/dL    HDL/Cholesterol Ratio 29.9 20.0 - 50.0 %    Total Cholesterol/HDL Ratio 3.3 2.0 - 5.0    Non-HDL Cholesterol 89 mg/dL   Hemoglobin A1C    Collection Time: 01/27/23 11:00 AM   Result Value Ref Range    Hemoglobin A1C 5.8 (H) 4.0 - 5.6 %    Estimated Avg Glucose 120 68 - 131 mg/dL         Assessment    1. Primary hypertension    2. Prediabetes    3. BMI 45.0-49.9, adult          Plan    Basilio was seen today for follow-up.    Diagnoses and all orders for this visit:    Primary hypertension    Prediabetes    BMI 45.0-49.9, adult    Other orders  -     semaglutide (OZEMPIC) 1 mg/dose (4 mg/3 mL); Inject 1 mg into the skin every 7 days.      Doing very well.  Tolerating Ozempic.  Will increase to 1 mg weekly.    FOLLOW-UP:  Follow up in about 6 months (around 10/27/2023) for recheck.    I spent a total of 35 minutes face to face and non-face to face on the date of this visit.This includes time preparing to see the patient (eg, review of tests, notes), obtaining and/or reviewing additional history from an independent historian and/or outside medical records, documenting clinical information in the electronic health record, independently interpreting results and/or communicating results to the patient/family/caregiver, or care coordinator.    Signed by:  Loy Lira MD

## 2023-05-04 ENCOUNTER — PATIENT MESSAGE (OUTPATIENT)
Dept: PRIMARY CARE CLINIC | Facility: CLINIC | Age: 43
End: 2023-05-04
Payer: COMMERCIAL

## 2023-05-08 ENCOUNTER — PATIENT MESSAGE (OUTPATIENT)
Dept: PRIMARY CARE CLINIC | Facility: CLINIC | Age: 43
End: 2023-05-08
Payer: COMMERCIAL

## 2023-05-08 DIAGNOSIS — R73.09 ELEVATED GLUCOSE: Primary | ICD-10-CM

## 2023-05-08 RX ORDER — SEMAGLUTIDE 1.34 MG/ML
1 INJECTION, SOLUTION SUBCUTANEOUS
Qty: 9 ML | Refills: 3 | Status: SHIPPED | OUTPATIENT
Start: 2023-05-08 | End: 2023-05-09 | Stop reason: SDUPTHER

## 2023-05-09 ENCOUNTER — PATIENT MESSAGE (OUTPATIENT)
Dept: PRIMARY CARE CLINIC | Facility: CLINIC | Age: 43
End: 2023-05-09
Payer: COMMERCIAL

## 2023-05-09 RX ORDER — SEMAGLUTIDE 1.34 MG/ML
1 INJECTION, SOLUTION SUBCUTANEOUS
Qty: 3 ML | Refills: 11 | Status: SHIPPED | OUTPATIENT
Start: 2023-05-09 | End: 2024-05-08

## 2023-05-10 ENCOUNTER — PATIENT MESSAGE (OUTPATIENT)
Dept: PRIMARY CARE CLINIC | Facility: CLINIC | Age: 43
End: 2023-05-10
Payer: COMMERCIAL

## 2023-05-22 ENCOUNTER — TELEPHONE (OUTPATIENT)
Dept: PRIMARY CARE CLINIC | Facility: CLINIC | Age: 43
End: 2023-05-22
Payer: COMMERCIAL

## 2023-05-22 NOTE — TELEPHONE ENCOUNTER
----- Message from Vanessa Christine sent at 5/22/2023  3:33 PM CDT -----  Contact: [employer]- Cover my meds  [employer] would like a call back at [phone], in regards to a PA for the pt [employer] authorization. (Ref# [id number])

## 2023-08-30 ENCOUNTER — PATIENT MESSAGE (OUTPATIENT)
Dept: PRIMARY CARE CLINIC | Facility: CLINIC | Age: 43
End: 2023-08-30
Payer: COMMERCIAL

## 2023-08-30 DIAGNOSIS — L98.9 BUMPS ON SKIN: Primary | ICD-10-CM

## 2023-09-07 ENCOUNTER — OFFICE VISIT (OUTPATIENT)
Dept: DERMATOLOGY | Facility: CLINIC | Age: 43
End: 2023-09-07
Payer: COMMERCIAL

## 2023-09-07 DIAGNOSIS — L66.3 DISSECTING CELLULITIS OF SCALP: Primary | ICD-10-CM

## 2023-09-07 DIAGNOSIS — L98.9 BUMPS ON SKIN: ICD-10-CM

## 2023-09-07 PROCEDURE — 99999 PR PBB SHADOW E&M-EST. PATIENT-LVL III: CPT | Mod: PBBFAC,,, | Performed by: STUDENT IN AN ORGANIZED HEALTH CARE EDUCATION/TRAINING PROGRAM

## 2023-09-07 PROCEDURE — 99999 PR PBB SHADOW E&M-EST. PATIENT-LVL III: ICD-10-PCS | Mod: PBBFAC,,, | Performed by: STUDENT IN AN ORGANIZED HEALTH CARE EDUCATION/TRAINING PROGRAM

## 2023-09-07 PROCEDURE — 1160F PR REVIEW ALL MEDS BY PRESCRIBER/CLIN PHARMACIST DOCUMENTED: ICD-10-PCS | Mod: CPTII,S$GLB,, | Performed by: STUDENT IN AN ORGANIZED HEALTH CARE EDUCATION/TRAINING PROGRAM

## 2023-09-07 PROCEDURE — 99204 PR OFFICE/OUTPT VISIT, NEW, LEVL IV, 45-59 MIN: ICD-10-PCS | Mod: S$GLB,,, | Performed by: STUDENT IN AN ORGANIZED HEALTH CARE EDUCATION/TRAINING PROGRAM

## 2023-09-07 PROCEDURE — 3044F PR MOST RECENT HEMOGLOBIN A1C LEVEL <7.0%: ICD-10-PCS | Mod: CPTII,S$GLB,, | Performed by: STUDENT IN AN ORGANIZED HEALTH CARE EDUCATION/TRAINING PROGRAM

## 2023-09-07 PROCEDURE — 1159F PR MEDICATION LIST DOCUMENTED IN MEDICAL RECORD: ICD-10-PCS | Mod: CPTII,S$GLB,, | Performed by: STUDENT IN AN ORGANIZED HEALTH CARE EDUCATION/TRAINING PROGRAM

## 2023-09-07 PROCEDURE — 3044F HG A1C LEVEL LT 7.0%: CPT | Mod: CPTII,S$GLB,, | Performed by: STUDENT IN AN ORGANIZED HEALTH CARE EDUCATION/TRAINING PROGRAM

## 2023-09-07 PROCEDURE — 1160F RVW MEDS BY RX/DR IN RCRD: CPT | Mod: CPTII,S$GLB,, | Performed by: STUDENT IN AN ORGANIZED HEALTH CARE EDUCATION/TRAINING PROGRAM

## 2023-09-07 PROCEDURE — 1159F MED LIST DOCD IN RCRD: CPT | Mod: CPTII,S$GLB,, | Performed by: STUDENT IN AN ORGANIZED HEALTH CARE EDUCATION/TRAINING PROGRAM

## 2023-09-07 PROCEDURE — 99204 OFFICE O/P NEW MOD 45 MIN: CPT | Mod: S$GLB,,, | Performed by: STUDENT IN AN ORGANIZED HEALTH CARE EDUCATION/TRAINING PROGRAM

## 2023-09-07 RX ORDER — CLOBETASOL PROPIONATE 0.46 MG/ML
SOLUTION TOPICAL 2 TIMES DAILY
Qty: 50 ML | Refills: 3 | Status: SHIPPED | OUTPATIENT
Start: 2023-09-07

## 2023-09-07 RX ORDER — CLINDAMYCIN HYDROCHLORIDE 300 MG/1
300 CAPSULE ORAL 2 TIMES DAILY
Qty: 60 CAPSULE | Refills: 0 | Status: SHIPPED | OUTPATIENT
Start: 2023-09-07 | End: 2023-11-08

## 2023-09-07 NOTE — PROGRESS NOTES
Subjective:       Patient ID:  Basliio Jain is a 43 y.o. male who presents for   Chief Complaint   Patient presents with    Spot     Pt has bumps on the back of the right scalp that have come and gone for the past 2 years   Pt also states that he is having acne flareups on the scalp as well     History of Present Illness: The patient presents with chief complaint of increased cystic nodules and hair loss in the scalp.  Location: throughout the scalp  Duration: ongoing off and on for about 2 years, recently started to flare  Signs/Symptoms: reports having several round nodules in the scalp, some drain, some painful, some areas of circular hair loss  Prior treatments: none      Spot        Review of Systems     Objective:    Physical Exam   Constitutional: He appears well-developed and well-nourished. No distress.   Neurological: He is alert and oriented to person, place, and time. He is not disoriented.   Psychiatric: He has a normal mood and affect.   Skin:   Areas Examined (abnormalities noted in diagram):   Scalp / Hair Palpated and Inspected  Head / Face Inspection Performed  Neck Inspection Performed  RUE Inspected  LUE Inspection Performed              Diagram Legend     Erythematous scaling macule/papule c/w actinic keratosis       Vascular papule c/w angioma      Pigmented verrucoid papule/plaque c/w seborrheic keratosis      Yellow umbilicated papule c/w sebaceous hyperplasia      Irregularly shaped tan macule c/w lentigo     1-2 mm smooth white papules consistent with Milia      Movable subcutaneous cyst with punctum c/w epidermal inclusion cyst      Subcutaneous movable cyst c/w pilar cyst      Firm pink to brown papule c/w dermatofibroma      Pedunculated fleshy papule(s) c/w skin tag(s)      Evenly pigmented macule c/w junctional nevus     Mildly variegated pigmented, slightly irregular-bordered macule c/w mildly atypical nevus      Flesh colored to evenly pigmented papule c/w intradermal nevus        Pink pearly papule/plaque c/w basal cell carcinoma      Erythematous hyperkeratotic cursted plaque c/w SCC      Surgical scar with no sign of skin cancer recurrence      Open and closed comedones      Inflammatory papules and pustules      Verrucoid papule consistent consistent with wart     Erythematous eczematous patches and plaques     Dystrophic onycholytic nail with subungual debris c/w onychomycosis     Umbilicated papule    Erythematous-base heme-crusted tan verrucoid plaque consistent with inflamed seborrheic keratosis     Erythematous Silvery Scaling Plaque c/w Psoriasis     See annotation      Assessment / Plan:        Dissecting cellulitis of scalp  -     clindamycin (CLEOCIN) 300 MG capsule; Take 1 capsule (300 mg total) by mouth 2 (two) times a day.  Dispense: 60 capsule; Refill: 0  -     clobetasoL (TEMOVATE) 0.05 % external solution; Apply topically 2 (two) times daily.  Dispense: 50 mL; Refill: 3             Follow up in about 8 weeks (around 11/2/2023).

## 2023-11-08 ENCOUNTER — OFFICE VISIT (OUTPATIENT)
Dept: PRIMARY CARE CLINIC | Facility: CLINIC | Age: 43
End: 2023-11-08
Payer: COMMERCIAL

## 2023-11-08 ENCOUNTER — LAB VISIT (OUTPATIENT)
Dept: LAB | Facility: HOSPITAL | Age: 43
End: 2023-11-08
Payer: COMMERCIAL

## 2023-11-08 VITALS
BODY MASS INDEX: 46.65 KG/M2 | HEIGHT: 69 IN | SYSTOLIC BLOOD PRESSURE: 118 MMHG | WEIGHT: 315 LBS | HEART RATE: 96 BPM | TEMPERATURE: 98 F | OXYGEN SATURATION: 99 % | DIASTOLIC BLOOD PRESSURE: 68 MMHG

## 2023-11-08 DIAGNOSIS — K21.00 GASTROESOPHAGEAL REFLUX DISEASE WITH ESOPHAGITIS WITHOUT HEMORRHAGE: ICD-10-CM

## 2023-11-08 DIAGNOSIS — R73.03 PREDIABETES: ICD-10-CM

## 2023-11-08 DIAGNOSIS — I10 PRIMARY HYPERTENSION: ICD-10-CM

## 2023-11-08 DIAGNOSIS — Z00.00 ANNUAL PHYSICAL EXAM: Primary | ICD-10-CM

## 2023-11-08 DIAGNOSIS — J45.30 MILD PERSISTENT ASTHMA WITHOUT COMPLICATION: ICD-10-CM

## 2023-11-08 DIAGNOSIS — E66.01 CLASS 3 SEVERE OBESITY WITH SERIOUS COMORBIDITY AND BODY MASS INDEX (BMI) OF 45.0 TO 49.9 IN ADULT, UNSPECIFIED OBESITY TYPE: ICD-10-CM

## 2023-11-08 LAB
BASOPHILS # BLD AUTO: 0.14 K/UL (ref 0–0.2)
BASOPHILS NFR BLD: 1.3 % (ref 0–1.9)
DIFFERENTIAL METHOD: ABNORMAL
EOSINOPHIL # BLD AUTO: 0.7 K/UL (ref 0–0.5)
EOSINOPHIL NFR BLD: 6.4 % (ref 0–8)
ERYTHROCYTE [DISTWIDTH] IN BLOOD BY AUTOMATED COUNT: 12.9 % (ref 11.5–14.5)
ESTIMATED AVG GLUCOSE: 105 MG/DL (ref 68–131)
HBA1C MFR BLD: 5.3 % (ref 4–5.6)
HCT VFR BLD AUTO: 55 % (ref 40–54)
HGB BLD-MCNC: 17.9 G/DL (ref 14–18)
IMM GRANULOCYTES # BLD AUTO: 0.02 K/UL (ref 0–0.04)
IMM GRANULOCYTES NFR BLD AUTO: 0.2 % (ref 0–0.5)
LYMPHOCYTES # BLD AUTO: 2.4 K/UL (ref 1–4.8)
LYMPHOCYTES NFR BLD: 21.8 % (ref 18–48)
MCH RBC QN AUTO: 29.7 PG (ref 27–31)
MCHC RBC AUTO-ENTMCNC: 32.5 G/DL (ref 32–36)
MCV RBC AUTO: 91 FL (ref 82–98)
MONOCYTES # BLD AUTO: 1 K/UL (ref 0.3–1)
MONOCYTES NFR BLD: 9.2 % (ref 4–15)
NEUTROPHILS # BLD AUTO: 6.8 K/UL (ref 1.8–7.7)
NEUTROPHILS NFR BLD: 61.1 % (ref 38–73)
NRBC BLD-RTO: 0 /100 WBC
PLATELET # BLD AUTO: 389 K/UL (ref 150–450)
PMV BLD AUTO: 10.1 FL (ref 9.2–12.9)
RBC # BLD AUTO: 6.02 M/UL (ref 4.6–6.2)
WBC # BLD AUTO: 11.17 K/UL (ref 3.9–12.7)

## 2023-11-08 PROCEDURE — 80053 COMPREHEN METABOLIC PANEL: CPT | Performed by: FAMILY MEDICINE

## 2023-11-08 PROCEDURE — 80061 LIPID PANEL: CPT | Performed by: FAMILY MEDICINE

## 2023-11-08 PROCEDURE — 99999 PR PBB SHADOW E&M-EST. PATIENT-LVL IV: CPT | Mod: PBBFAC,,, | Performed by: FAMILY MEDICINE

## 2023-11-08 PROCEDURE — 99396 PR PREVENTIVE VISIT,EST,40-64: ICD-10-PCS | Mod: S$GLB,,, | Performed by: FAMILY MEDICINE

## 2023-11-08 PROCEDURE — 3044F HG A1C LEVEL LT 7.0%: CPT | Mod: CPTII,S$GLB,, | Performed by: FAMILY MEDICINE

## 2023-11-08 PROCEDURE — 84443 ASSAY THYROID STIM HORMONE: CPT | Performed by: FAMILY MEDICINE

## 2023-11-08 PROCEDURE — 99396 PREV VISIT EST AGE 40-64: CPT | Mod: S$GLB,,, | Performed by: FAMILY MEDICINE

## 2023-11-08 PROCEDURE — 99999 PR PBB SHADOW E&M-EST. PATIENT-LVL IV: ICD-10-PCS | Mod: PBBFAC,,, | Performed by: FAMILY MEDICINE

## 2023-11-08 PROCEDURE — 3074F PR MOST RECENT SYSTOLIC BLOOD PRESSURE < 130 MM HG: ICD-10-PCS | Mod: CPTII,S$GLB,, | Performed by: FAMILY MEDICINE

## 2023-11-08 PROCEDURE — 1159F PR MEDICATION LIST DOCUMENTED IN MEDICAL RECORD: ICD-10-PCS | Mod: CPTII,S$GLB,, | Performed by: FAMILY MEDICINE

## 2023-11-08 PROCEDURE — 36415 COLL VENOUS BLD VENIPUNCTURE: CPT | Mod: PN | Performed by: FAMILY MEDICINE

## 2023-11-08 PROCEDURE — 3008F BODY MASS INDEX DOCD: CPT | Mod: CPTII,S$GLB,, | Performed by: FAMILY MEDICINE

## 2023-11-08 PROCEDURE — 3008F PR BODY MASS INDEX (BMI) DOCUMENTED: ICD-10-PCS | Mod: CPTII,S$GLB,, | Performed by: FAMILY MEDICINE

## 2023-11-08 PROCEDURE — 3044F PR MOST RECENT HEMOGLOBIN A1C LEVEL <7.0%: ICD-10-PCS | Mod: CPTII,S$GLB,, | Performed by: FAMILY MEDICINE

## 2023-11-08 PROCEDURE — 85025 COMPLETE CBC W/AUTO DIFF WBC: CPT | Performed by: FAMILY MEDICINE

## 2023-11-08 PROCEDURE — 3078F DIAST BP <80 MM HG: CPT | Mod: CPTII,S$GLB,, | Performed by: FAMILY MEDICINE

## 2023-11-08 PROCEDURE — 1159F MED LIST DOCD IN RCRD: CPT | Mod: CPTII,S$GLB,, | Performed by: FAMILY MEDICINE

## 2023-11-08 PROCEDURE — 3074F SYST BP LT 130 MM HG: CPT | Mod: CPTII,S$GLB,, | Performed by: FAMILY MEDICINE

## 2023-11-08 PROCEDURE — 3078F PR MOST RECENT DIASTOLIC BLOOD PRESSURE < 80 MM HG: ICD-10-PCS | Mod: CPTII,S$GLB,, | Performed by: FAMILY MEDICINE

## 2023-11-08 PROCEDURE — 83036 HEMOGLOBIN GLYCOSYLATED A1C: CPT | Performed by: FAMILY MEDICINE

## 2023-11-08 RX ORDER — ONDANSETRON 4 MG/1
4 TABLET, ORALLY DISINTEGRATING ORAL EVERY 6 HOURS PRN
Qty: 20 TABLET | Refills: 3 | Status: SHIPPED | OUTPATIENT
Start: 2023-11-08

## 2023-11-08 RX ORDER — OMEPRAZOLE 40 MG/1
40 CAPSULE, DELAYED RELEASE ORAL DAILY
Qty: 90 CAPSULE | Refills: 3 | Status: SHIPPED | OUTPATIENT
Start: 2023-11-08 | End: 2024-11-07

## 2023-11-08 NOTE — PROGRESS NOTES
[facility]       [address] [place] [address]      Phone: [phone]      Fax: [phone]    Loy Lira MD    Office Visit  11/08/2023    Follow-up (Ozempic)      43-year-old gentleman presents today for annual wellness exam.    Overall, feels okay.  Lately, has been having some stomach issues.  Lots of gas, occasional cramps, diarrhea.  Occasional nausea.  When he lays down, feels okay, but starts to feel bad when he gets up.  Has been taking Zofran, which helps.  Interestingly, some weeks are better than others.  Just when he thinks the Ozempic is not affecting his stomach, it will start.  Then, it will stop for several days.    Has been using Ozempic to help with weight loss.  Tolerating the 1 mg weekly dose.  Since last visit, has lost almost 20 lb.  States he has been his current weight since July.  But, he counts that as a when because usually from July through October he tends to gain weight because it is the busy time at work and he will stress eat.  This year, however, he is maintained his weight.  Now that work has slow down, he expects to be able to focus more on diet, exercise again.    Has hypertension.  Still not on any medications.  Working on diet, exercise, weight loss.  Blood pressure has been even better since he lost additional weight.    Also has issues with asthma.  Currently being managed by his ENT.  Takes Dupixent regularly.  Also supplements with Xyzal, as needed.  Has a compounded spray that he also uses.  Has noticed that his allergies have been acting up a bit more over the last couple of months.  Alternating the Xyzal with another antihistamine.  Has used his inhaler two or 3 times in the past month.    PMH:  HTN.  Pre DM.  Asthma.  Hiatal hernia/GERD.    PSH: Nasal surgery.  EGD.    F MH:  Obesity.  DM.  Grandmother had unknown cancer.    Allergies:  PCN-as a child.  Aspirin caused eye swelling.  Doxycycline caused  hives.  Losartan made his feet and legs swell.  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    T: Denies   A: Occasionally/rarely   D:  Denies    Exercise:  Walks on a treadmill 2-3 times per week    ENT:  Dr. Fabio Garza        Immunizations:  Immunization History   Administered Date(s) Administered    COVID-19 Vaccine 07/22/2021    Tdap 01/05/2021       Care Teams:  Patient Care Team:  Loy Lira MD as PCP - General (Family Medicine)    Medical History:  Past Medical History:   Diagnosis Date    Asthma     Sleep apnea, unspecified        Social History:  Social History     Socioeconomic History    Marital status:    Tobacco Use    Smoking status: Never    Smokeless tobacco: Never   Substance and Sexual Activity    Alcohol use: Yes     Comment: Occasional    Drug use: No    Sexual activity: Yes     Partners: Female     Birth control/protection: Other-see comments     Comment: Spouse had hysteroctomy     Social Determinants of Health     Financial Resource Strain: Unknown (9/7/2023)    Overall Financial Resource Strain (CARDIA)     Difficulty of Paying Living Expenses: Patient refused   Food Insecurity: Unknown (9/7/2023)    Hunger Vital Sign     Worried About Running Out of Food in the Last Year: Patient refused     Ran Out of Food in the Last Year: Patient refused   Transportation Needs: Unknown (9/7/2023)    PRAPARE - Transportation     Lack of Transportation (Medical): Patient refused     Lack of Transportation (Non-Medical): Patient refused   Physical Activity: Unknown (9/7/2023)    Exercise Vital Sign     Days of Exercise per Week: 0 days   Stress: No Stress Concern Present (9/7/2023)    [employer] [place] [employer] - Occupational Stress Questionnaire     Feeling of Stress : Not at all   Social Connections: Unknown (9/7/2023)    Social Connection and Isolation Panel [NHANES]     Frequency of Communication with Friends  and Family: Patient refused     Frequency of Social Gatherings with Friends and Family: Patient refused     Active Member of Clubs or Organizations: Yes     Attends Club or Organization Meetings: Patient refused     Marital Status:    Housing Stability: Unknown (9/7/2023)    Housing Stability Vital Sign     Unable to Pay for Housing in the Last Year: Patient refused     Unstable Housing in the Last Year: Patient refused       Alcohol History:  Social History     Substance and Sexual Activity   Alcohol Use Yes    Comment: Occasional       Tobacco History:  Social History     Tobacco Use   Smoking Status Never   Smokeless Tobacco Never       Family History:  Family History   Problem Relation Age of Onset    Hypertension Mother     Atrial fibrillation Mother     Hypothyroidism Mother     Diabetes Mother     Hypertension Father     ADD / ADHD Sister        Surgical History:  Past Surgical History:   Procedure Laterality Date    EYE SURGERY  2004    Lasik    HERNIA REPAIR      NASAL SEPTOPLASTY  2021    WISDOM TOOTH EXTRACTION      X 4       Allergies:  Review of patient's allergies indicates:   Allergen Reactions    Asa [aspirin] Swelling    Doxycycline Hives and Rash    Losartan Edema    Pcn [penicillins] Anaphylaxis       Medications:    Current Outpatient Medications:     clobetasoL (TEMOVATE) 0.05 % external solution, Apply topically 2 (two) times daily., Disp: 50 mL, Rfl: 3    DUPIXENT  mg/2 mL PnIj, Inject into the skin., Disp: , Rfl:     semaglutide (OZEMPIC) 1 mg/dose (4 mg/3 mL), Inject 1 mg into the skin every 7 days., Disp: 3 mL, Rfl: 11    XHANCE 93 mcg/actuation AerB, by Each Nostril route., Disp: , Rfl:     omeprazole (PRILOSEC) 40 MG capsule, Take 1 capsule (40 mg total) by mouth once daily., Disp: 90 capsule, Rfl: 3    ondansetron (ZOFRAN-ODT) 4 MG TbDL, Take 1 tablet (4 mg total) by mouth every 6 (six) hours as needed (nausea)., Disp: 20 tablet, Rfl: 3    Current Facility-Administered  "Medications:     EPINEPHrine (EPIPEN) 0.3 mg/0.3 mL pen injection 0.3 mg, 0.3 mg, Intramuscular, PRN, Jarod Wilson MD    Health Maintenance:  Health Maintenance   Topic Date Due    Lipid Panel  01/27/2028    TETANUS VACCINE  01/05/2031    Hepatitis C Screening  Completed       Screening Questions  1.  Do you smoke? No  2.  In the past month have you been bothered by feeling "down", depressed or hopeless? No  3.  In the past month, have you experienced a loss of interest or pleasure in doing things? No  4.  In the past 3 months, on any single occasion have you had 5 or more drinks containing alcohol? No  5.  Regarding your use of alcohol, have you ever felt you should cut down on your drinking? No  6.  Are you sexually active? Yes  7   Do you exercise?  three times a week    Counseling  The patient was counseled regarding diet and exercise, motor vehicle safety, sun exposure, and use of vitamins and supplements.  The patient was counseled regarding Living Will/Durable Power-Of-.  The patient was given information regarding the dangers of smoking and the overuse of alcohol.    Review of Systems   Constitutional:  Negative for activity change, appetite change, chills and fever.   HENT:  Negative for congestion, postnasal drip, rhinorrhea, sore throat and trouble swallowing.    Respiratory:  Negative for cough and shortness of breath.    Cardiovascular:  Negative for chest pain and palpitations.   Gastrointestinal:  Negative for abdominal pain, constipation, diarrhea, nausea and vomiting.   Genitourinary:  Negative for difficulty urinating.   Musculoskeletal:  Negative for arthralgias and myalgias.   Skin:  Negative for color change and rash.   Neurological:  Negative for headaches.   All other systems reviewed and are negative.       Objective   Vitals:    11/08/23 0911   BP: 118/68   Pulse: 96   Temp: 98 °F (36.7 °C)   SpO2: 99%   Weight: (!) 143.7 kg (316 lb 12.8 oz)   Height: 5' 9" (1.753 m) "     Physical Exam  Vitals and nursing note reviewed.   Constitutional:       General: He is not in acute distress.     Appearance: Normal appearance.   HENT:      Head: Normocephalic and atraumatic.      Right Ear: Tympanic membrane, ear canal and external ear normal.      Left Ear: Tympanic membrane, ear canal and external ear normal.      Nose: Nose normal. No congestion or rhinorrhea.      Mouth/Throat:      Mouth: Mucous membranes are moist.      Pharynx: Oropharynx is clear. No oropharyngeal exudate or posterior oropharyngeal erythema.   Eyes:      Extraocular Movements: Extraocular movements intact.      Conjunctiva/sclera: Conjunctivae normal.      Pupils: Pupils are equal, round, and reactive to light.   Cardiovascular:      Rate and Rhythm: Normal rate and regular rhythm.   Pulmonary:      Effort: Pulmonary effort is normal.      Breath sounds: No wheezing, rhonchi or rales.   Musculoskeletal:         General: Normal range of motion.      Cervical back: Normal range of motion.   Lymphadenopathy:      Cervical: No cervical adenopathy.   Skin:     General: Skin is warm and dry.   Neurological:      General: No focal deficit present.      Mental Status: He is alert.          No results found for this or any previous visit (from the past 4368 hour(s)).    Cody Richmond was seen today for follow-up.    Diagnoses and all orders for this visit:    Annual physical exam    Primary hypertension  -     CBC Auto Differential; Future  -     Comprehensive Metabolic Panel; Future  -     TSH; Future  -     Lipid Panel; Future    Prediabetes  -     Hemoglobin A1C; Future    Mild persistent asthma without complication    Class 3 severe obesity with serious comorbidity and body mass index (BMI) of 45.0 to 49.9 in adult, unspecified obesity type    Gastroesophageal reflux disease with esophagitis without hemorrhage  -     omeprazole (PRILOSEC) 40 MG capsule; Take 1 capsule (40 mg total) by mouth once daily.    Other orders  -      ondansetron (ZOFRAN-ODT) 4 MG TbDL; Take 1 tablet (4 mg total) by mouth every 6 (six) hours as needed (nausea).    Physically, everything looks pretty good today.      Will get screening labs, as above.      Doing well on Ozempic.  We will continue the 1 mg dose as long as we can.  If insurance stopped covering it, we can always switch to empower.    We will send some Zofran to help with nausea.  Have him use omeprazole, 3-5 days at a time to help with gas, bloating, reflux.    If you start using inhaler more frequently, we can add Singulair to his antihistamine to see if that gives slightly better control.    Follow-up:  Follow up in about 6 months (around 5/8/2024) for check up, and in 1 year for annual exam.    Signed by:  oLy Lira MD

## 2023-11-08 NOTE — PATIENT INSTRUCTIONS
Overall, everything looks pretty good today.      Let us get some screening blood work done today.  Results will be posted to [facility] as soon as they are available.      I am glad to hear that you have been able to maintain your weight during your busy season.  Hopefully, now that things are settled down, weight loss will increase.      For the stomach symptoms, let us try using omeprazole once daily.  This will help cut down on some of the stomach acid and hopefully minimize the cramps, diarrhea, gas, etc..  Use it for about 3-5 days, then stop.  If the symptoms return, restart for 3-5 days, etc..      Let us continue the Ozempic as long as your insurance will continue to cover it.  At some point, if they stop, please let me know.  We can always use our pharmacy in [place] instead.    Continue to eat a healthy diet.  Be careful with portion sizes.  Includes lots of fresh fruits, vegetables, whole grains, lean proteins.  See info below.    Keep hydrated.  Be sure to drink at least 8-10, 8 oz, glasses of water every day.    Stay active.  Try to do some sort of physical activity every day.  Nothing outrageous, just try walking for 10-15 minutes each day.     Keep using your antihistamines, as you have been.  If you find that you are using your albuterol inhaler more frequently, such as two or 3 times per week, please let me know.  We can always add some Singulair to the antihistamine to see if we can get better control of the asthma/allergy symptoms.

## 2023-11-09 LAB
ALBUMIN SERPL BCP-MCNC: 4.1 G/DL (ref 3.5–5.2)
ALP SERPL-CCNC: 70 U/L (ref 55–135)
ALT SERPL W/O P-5'-P-CCNC: 19 U/L (ref 10–44)
ANION GAP SERPL CALC-SCNC: 12 MMOL/L (ref 8–16)
AST SERPL-CCNC: 18 U/L (ref 10–40)
BILIRUB SERPL-MCNC: 0.7 MG/DL (ref 0.1–1)
BUN SERPL-MCNC: 18 MG/DL (ref 6–20)
CALCIUM SERPL-MCNC: 10.3 MG/DL (ref 8.7–10.5)
CHLORIDE SERPL-SCNC: 103 MMOL/L (ref 95–110)
CHOLEST SERPL-MCNC: 140 MG/DL (ref 120–199)
CHOLEST/HDLC SERPL: 3.2 {RATIO} (ref 2–5)
CO2 SERPL-SCNC: 26 MMOL/L (ref 23–29)
CREAT SERPL-MCNC: 1.4 MG/DL (ref 0.5–1.4)
EST. GFR  (NO RACE VARIABLE): >60 ML/MIN/1.73 M^2
GLUCOSE SERPL-MCNC: 100 MG/DL (ref 70–110)
HDLC SERPL-MCNC: 44 MG/DL (ref 40–75)
HDLC SERPL: 31.4 % (ref 20–50)
LDLC SERPL CALC-MCNC: 79.2 MG/DL (ref 63–159)
NONHDLC SERPL-MCNC: 96 MG/DL
POTASSIUM SERPL-SCNC: 4.4 MMOL/L (ref 3.5–5.1)
PROT SERPL-MCNC: 8.2 G/DL (ref 6–8.4)
SODIUM SERPL-SCNC: 141 MMOL/L (ref 136–145)
TRIGL SERPL-MCNC: 84 MG/DL (ref 30–150)
TSH SERPL DL<=0.005 MIU/L-ACNC: 1.23 UIU/ML (ref 0.4–4)

## 2023-12-11 ENCOUNTER — PATIENT MESSAGE (OUTPATIENT)
Dept: PRIMARY CARE CLINIC | Facility: CLINIC | Age: 43
End: 2023-12-11
Payer: COMMERCIAL

## 2023-12-12 ENCOUNTER — PATIENT MESSAGE (OUTPATIENT)
Dept: PRIMARY CARE CLINIC | Facility: CLINIC | Age: 43
End: 2023-12-12

## 2023-12-12 ENCOUNTER — OFFICE VISIT (OUTPATIENT)
Dept: PRIMARY CARE CLINIC | Facility: CLINIC | Age: 43
End: 2023-12-12
Payer: COMMERCIAL

## 2023-12-12 DIAGNOSIS — R73.03 PREDIABETES: ICD-10-CM

## 2023-12-12 DIAGNOSIS — E66.01 CLASS 3 SEVERE OBESITY WITH SERIOUS COMORBIDITY AND BODY MASS INDEX (BMI) OF 45.0 TO 49.9 IN ADULT, UNSPECIFIED OBESITY TYPE: ICD-10-CM

## 2023-12-12 DIAGNOSIS — I10 PRIMARY HYPERTENSION: Primary | ICD-10-CM

## 2023-12-12 PROCEDURE — 99215 OFFICE O/P EST HI 40 MIN: CPT | Mod: 95,,, | Performed by: FAMILY MEDICINE

## 2023-12-12 PROCEDURE — 99215 PR OFFICE/OUTPT VISIT, EST, LEVL V, 40-54 MIN: ICD-10-PCS | Mod: 95,,, | Performed by: FAMILY MEDICINE

## 2023-12-12 PROCEDURE — 3044F HG A1C LEVEL LT 7.0%: CPT | Mod: CPTII,95,, | Performed by: FAMILY MEDICINE

## 2023-12-12 PROCEDURE — 3044F PR MOST RECENT HEMOGLOBIN A1C LEVEL <7.0%: ICD-10-PCS | Mod: CPTII,95,, | Performed by: FAMILY MEDICINE

## 2023-12-12 RX ORDER — SEMAGLUTIDE 1.7 MG/.75ML
1.7 INJECTION, SOLUTION SUBCUTANEOUS
Qty: 3 ML | Refills: 11 | Status: SHIPPED | OUTPATIENT
Start: 2023-12-12

## 2023-12-12 NOTE — PATIENT INSTRUCTIONS
Will try sending prescription for Wegovy to your local pharmacy to see if it is covered.  If covered, but not available, we can try using ZepBound instead.  If neither one is covered, we can use the compounding pharmacy instead.    Separate prescription for semaglutide sent to the compounding pharmacy ([facility]) in [place] that we discussed.  Please call them at [phone] to ensure that they receive the prescription, to give them address/shipping info, payment info, etc..     Their website is:  [email or web address]    When you fill the prescription, go ahead and use 35 units (1.7 mg) weekly.  This is the next dosing increment from the 1 mg dose.      The prescription will have approximately 12.5 mg of medication in the bottle.  Therefore, the prescription will last about seven weeks.  There are refills available.  You will need to contact [facility] directly for refills.    Also, when you receive the prescription, if you have any questions about administration, please feel free to come up and we can walk you through the process of administering the medication.

## 2023-12-12 NOTE — TELEPHONE ENCOUNTER
Please advise if these were to be sent to [facility] or if it was suppose to be sent to a different pharmacy

## 2023-12-12 NOTE — PROGRESS NOTES
"    [facility]       [address] [place] [address]      Phone: [phone]      Fax: [phone]    Loy Lira MD                Video Visit  12/12/2023        Subjective      HPI:  Basilio Jain is a 43 y.o. male presents today via video for "No chief complaint on file.  ."     43-year-old gentleman presents today via video visit to discuss medications, weight loss.    Has been using Ozempic to help with weight loss.  Takes 1 mg weekly.  Tolerates it fine.  Has lost over 50 lb on it, so far.  Unfortunately, weight has plateaued recently.  He has been stuck at 316 lb since July.  His goal is to get under 300 lb because he wants to apply for life insurance next year.  So far, insurance has been paying for the Ozempic.  He has not sure if he should continue this dose or increase.  Would like to discuss options.    Has hypertension.  Still not on any medications.  Working on diet, exercise, weight loss.  Blood pressure has been even better since he lost additional weight.    Also has issues with asthma.  Currently being managed by his ENT.  Takes Dupixent regularly.  Also supplements with Xyzal, as needed.  Has a compounded spray that he also uses.  Has noticed that his allergies have been acting up a bit more over the last couple of months.  Alternating the Xyzal with another antihistamine.  Has used his inhaler two or 3 times in the past month.    PMH:  HTN.  Pre DM.  Asthma.  Hiatal hernia/GERD.    PSH: Nasal surgery.  EGD.    F MH:  Obesity.  DM.  Grandmother had unknown cancer.    Allergies:  PCN-as a child.  Aspirin caused eye swelling.  Doxycycline caused hives.  Losartan made his feet and legs swell.  Social:  Works in [employer].  Owns two day cares.  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    T: Denies   A: Occasionally/rarely   D:  Denies    Exercise:  Walks on a treadmill 2-3 times per week    ENT:  Dr. Fabio Garza        The " following were updated and reviewed by myself in the chart: medications, past medical history, past surgical history, family history, social history, and allergies.     Medications:  Current Outpatient Medications on File Prior to Visit   Medication Sig Dispense Refill    clobetasoL (TEMOVATE) 0.05 % external solution Apply topically 2 (two) times daily. 50 mL 3    DUPIXENT  mg/2 mL PnIj Inject into the skin.      omeprazole (PRILOSEC) 40 MG capsule Take 1 capsule (40 mg total) by mouth once daily. 90 capsule 3    ondansetron (ZOFRAN-ODT) 4 MG TbDL Take 1 tablet (4 mg total) by mouth every 6 (six) hours as needed (nausea). 20 tablet 3    semaglutide (OZEMPIC) 1 mg/dose (4 mg/3 mL) Inject 1 mg into the skin every 7 days. 3 mL 11    XHANCE 93 mcg/actuation AerB by Each Nostril route.       Current Facility-Administered Medications on File Prior to Visit   Medication Dose Route Frequency Provider Last Rate Last Admin    EPINEPHrine (EPIPEN) 0.3 mg/0.3 mL pen injection 0.3 mg  0.3 mg Intramuscular PRN Jarod Wilson MD            PMHx:  Past Medical History:   Diagnosis Date    Asthma     Sleep apnea, unspecified       Patient Active Problem List    Diagnosis Date Noted    COVID-19 07/30/2021    ALDO (generalized anxiety disorder) 03/08/2021    Status post nasal surgery 03/05/2021    Immunization deficiency 01/05/2021    Pre-diabetes 01/05/2021    Essential hypertension 12/29/2020    Snores 12/29/2020    Morbid obesity with BMI of 50.0-59.9, adult 01/03/2018        PSHx:  Past Surgical History:   Procedure Laterality Date    EYE SURGERY  2004    Lasik    HERNIA REPAIR      NASAL SEPTOPLASTY  2021    WISDOM TOOTH EXTRACTION      X 4        FHx:  Family History   Problem Relation Age of Onset    Hypertension Mother     Atrial fibrillation Mother     Hypothyroidism Mother     Diabetes Mother     Hypertension Father     ADD / ADHD Sister         Social:  Social History     Socioeconomic History    Marital status:     Tobacco Use    Smoking status: Never    Smokeless tobacco: Never   Substance and Sexual Activity    Alcohol use: Yes     Comment: Occasional    Drug use: No    Sexual activity: Yes     Partners: Female     Birth control/protection: Other-see comments     Comment: Spouse had hysteroctomy     Social Determinants of Health     Financial Resource Strain: Unknown (9/7/2023)    Overall Financial Resource Strain (CARDIA)     Difficulty of Paying Living Expenses: Patient refused   Food Insecurity: Unknown (9/7/2023)    Hunger Vital Sign     Worried About Running Out of Food in the Last Year: Patient refused     Ran Out of Food in the Last Year: Patient refused   Transportation Needs: Unknown (9/7/2023)    PRAPARE - Transportation     Lack of Transportation (Medical): Patient refused     Lack of Transportation (Non-Medical): Patient refused   Physical Activity: Unknown (9/7/2023)    Exercise Vital Sign     Days of Exercise per Week: 0 days   Stress: No Stress Concern Present (9/7/2023)    [employer] [place] - Occupational Stress Questionnaire     Feeling of Stress : Not at all   Social Connections: Unknown (9/7/2023)    Social Connection and Isolation Panel [NHANES]     Frequency of Communication with Friends and Family: Patient refused     Frequency of Social Gatherings with Friends and Family: Patient refused     Active Member of Clubs or Organizations: Yes     Attends Club or Organization Meetings: Patient refused     Marital Status:    Housing Stability: Unknown (9/7/2023)    Housing Stability Vital Sign     Unable to Pay for Housing in the Last Year: Patient refused     Unstable Housing in the Last Year: Patient refused        Allergies:  Review of patient's allergies indicates:   Allergen Reactions    Asa [aspirin] Swelling    Doxycycline Hives and Rash    Losartan Edema    Pcn [penicillins] Anaphylaxis        ROS:  Review of Systems   Constitutional:  Positive for activity  "change. Negative for appetite change, chills, fever and unexpected weight change.   HENT:  Negative for congestion, hearing loss, postnasal drip, rhinorrhea, sore throat and trouble swallowing.    Eyes:  Negative for discharge and visual disturbance.   Respiratory:  Negative for cough, chest tightness, shortness of breath and wheezing.    Cardiovascular:  Negative for chest pain and palpitations.   Gastrointestinal:  Negative for abdominal pain, blood in stool, constipation, diarrhea, nausea and vomiting.   Endocrine: Negative for polydipsia and polyuria.   Genitourinary:  Negative for difficulty urinating, hematuria and urgency.   Musculoskeletal:  Negative for arthralgias, joint swelling, myalgias and neck pain.   Skin:  Negative for color change and rash.   Neurological:  Negative for weakness and headaches.   Psychiatric/Behavioral:  Negative for confusion and dysphoric mood.    All other systems reviewed and are negative.         Objective      There were no vitals taken for this visit.  Ht Readings from Last 3 Encounters:   11/08/23 5' 9" (1.753 m)   04/27/23 5' 9" (1.753 m)   01/27/23 5' 9" (1.753 m)     Wt Readings from Last 3 Encounters:   11/08/23 (!) 143.7 kg (316 lb 12.8 oz)   04/27/23 (!) 151.5 kg (334 lb)   01/27/23 (!) 168.1 kg (370 lb 9.5 oz)       PHYSICAL EXAM:  Physical Exam  Constitutional:       General: He is not in acute distress.     Appearance: Normal appearance. He is not ill-appearing.   HENT:      Head: Normocephalic and atraumatic.   Pulmonary:      Effort: Pulmonary effort is normal. No respiratory distress.   Neurological:      Mental Status: He is alert.   Psychiatric:         Mood and Affect: Mood normal.         Behavior: Behavior normal.         Thought Content: Thought content normal.              LABS / IMAGING:  Recent Results (from the past 4368 hour(s))   CBC Auto Differential    Collection Time: 11/08/23 10:00 AM   Result Value Ref Range    WBC 11.17 3.90 - 12.70 K/uL    RBC " 6.02 4.60 - 6.20 M/uL    Hemoglobin 17.9 14.0 - 18.0 g/dL    Hematocrit 55.0 (H) 40.0 - 54.0 %    MCV 91 82 - 98 fL    MCH 29.7 27.0 - 31.0 pg    MCHC 32.5 32.0 - 36.0 g/dL    RDW 12.9 11.5 - 14.5 %    Platelets 389 150 - 450 K/uL    MPV 10.1 9.2 - 12.9 fL    Immature Granulocytes 0.2 0.0 - 0.5 %    Gran # (ANC) 6.8 1.8 - 7.7 K/uL    Immature Grans (Abs) 0.02 0.00 - 0.04 K/uL    Lymph # 2.4 1.0 - 4.8 K/uL    Mono # 1.0 0.3 - 1.0 K/uL    Eos # 0.7 (H) 0.0 - 0.5 K/uL    Baso # 0.14 0.00 - 0.20 K/uL    nRBC 0 0 /100 WBC    Gran % 61.1 38.0 - 73.0 %    Lymph % 21.8 18.0 - 48.0 %    Mono % 9.2 4.0 - 15.0 %    Eosinophil % 6.4 0.0 - 8.0 %    Basophil % 1.3 0.0 - 1.9 %    Differential Method Automated    Comprehensive Metabolic Panel    Collection Time: 11/08/23 10:00 AM   Result Value Ref Range    Sodium 141 136 - 145 mmol/L    Potassium 4.4 3.5 - 5.1 mmol/L    Chloride 103 95 - 110 mmol/L    CO2 26 23 - 29 mmol/L    Glucose 100 70 - 110 mg/dL    BUN 18 6 - 20 mg/dL    Creatinine 1.4 0.5 - 1.4 mg/dL    Calcium 10.3 8.7 - 10.5 mg/dL    Total Protein 8.2 6.0 - 8.4 g/dL    Albumin 4.1 3.5 - 5.2 g/dL    Total Bilirubin 0.7 0.1 - 1.0 mg/dL    Alkaline Phosphatase 70 55 - 135 U/L    AST 18 10 - 40 U/L    ALT 19 10 - 44 U/L    eGFR >60.0 >60 mL/min/1.73 m^2    Anion Gap 12 8 - 16 mmol/L   TSH    Collection Time: 11/08/23 10:00 AM   Result Value Ref Range    TSH 1.225 0.400 - 4.000 uIU/mL   Lipid Panel    Collection Time: 11/08/23 10:00 AM   Result Value Ref Range    Cholesterol 140 120 - 199 mg/dL    Triglycerides 84 30 - 150 mg/dL    HDL 44 40 - 75 mg/dL    LDL Cholesterol 79.2 63.0 - 159.0 mg/dL    HDL/Cholesterol Ratio 31.4 20.0 - 50.0 %    Total Cholesterol/HDL Ratio 3.2 2.0 - 5.0    Non-HDL Cholesterol 96 mg/dL   Hemoglobin A1C    Collection Time: 11/08/23 10:00 AM   Result Value Ref Range    Hemoglobin A1C 5.3 4.0 - 5.6 %    Estimated Avg Glucose 105 68 - 131 mg/dL         Assessment    1. Primary hypertension    2.  "Prediabetes    3. Class 3 severe obesity with serious comorbidity and body mass index (BMI) of 45.0 to 49.9 in adult, unspecified obesity type          Plan    Diagnoses and all orders for this visit:    Primary hypertension    Prediabetes    Class 3 severe obesity with serious comorbidity and body mass index (BMI) of 45.0 to 49.9 in adult, unspecified obesity type  -     semaglutide, weight loss, (WEGOVY) 1.7 mg/0.75 mL PnIj; Inject 1.7 mg into the skin every 7 days.    Other orders  -     Discontinue: INV semaglutide/placebo injection; Semaglutide 5mg / Cyanocobalamin 0.5mg / 1 mL - Inject 35 units (0.35 mL) SQ every 7 days - Disp: 2.5 mL vial - Please disp with U-100 syringes and 5/32" 32g needles 0.5cc.  -     INV semaglutide/placebo injection; Semaglutide 5mg / Cyanocobalamin 0.5mg / 1 mL - Inject 35 units (0.35 mL) SQ every 7 days - Disp: 2.5 mL vial - Please disp with U-100 syringes and 5/32" 32g needles 0.5cc.    Reviewed recent blood work with patient.    Discussed Ozempic, Wegovy, semaglutide, Mounjaro, ZepBound.    Interestingly, there seems to be an amputation in patient's chart from a surgery center in 2021 that shows he has a diagnosis of type 2 diabetes.  This maybe why insurance was paying for his Ozempic.  As far as he knows, he has never been diabetic.  A1c has always been, at most, in the prediabetic range.    He worries about this diagnosis on his chart because he is applying for life insurance next year.    Rather than continue on the Ozempic, will try sending prescription for Wegovy to his pharmacy.  If covered, but not available, will try sending ZepBound instead.  If not covered, we can try using semaglutide from [facility].    He has been doing well on the 1 mg dose, but has plateaued. For Wegovy/semaglutide will try increasing to 1.7 mg weekly.      FOLLOW-UP:  Follow up in about 3 months (around 3/12/2024) for recheck.    The patient location is:  [place]  The chief complaint leading to " consultation is:  Weight loss, medications, diabetes, insurance    Visit type: audiovisual    Face to Face time with patient: 35 minutes    45 minutes of total time spent on the encounter, which includes face to face time and non-face to face time preparing to see the patient (eg, review of tests), Obtaining and/or reviewing separately obtained history, Documenting clinical information in the electronic or other health record, Independently interpreting results (not separately reported) and communicating results to the patient/family/caregiver, or Care coordination (not separately reported).     Each patient to whom he or she provides medical services by telemedicine is:  (1) informed of the relationship between the physician and patient and the respective role of any other health care provider with respect to management of the patient; and (2) notified that he or she may decline to receive medical services by telemedicine and may withdraw from such care at any time.    Signed by:  Loy Lira MD

## 2024-05-08 ENCOUNTER — OFFICE VISIT (OUTPATIENT)
Dept: PRIMARY CARE CLINIC | Facility: CLINIC | Age: 44
End: 2024-05-08
Payer: COMMERCIAL

## 2024-05-08 VITALS
BODY MASS INDEX: 46.65 KG/M2 | DIASTOLIC BLOOD PRESSURE: 78 MMHG | HEIGHT: 69 IN | SYSTOLIC BLOOD PRESSURE: 118 MMHG | WEIGHT: 315 LBS | HEART RATE: 86 BPM | OXYGEN SATURATION: 97 % | TEMPERATURE: 98 F

## 2024-05-08 DIAGNOSIS — I10 PRIMARY HYPERTENSION: ICD-10-CM

## 2024-05-08 DIAGNOSIS — R73.03 PREDIABETES: Primary | ICD-10-CM

## 2024-05-08 DIAGNOSIS — Z12.5 ENCOUNTER FOR SCREENING FOR MALIGNANT NEOPLASM OF PROSTATE: ICD-10-CM

## 2024-05-08 DIAGNOSIS — E66.01 CLASS 3 SEVERE OBESITY WITH SERIOUS COMORBIDITY AND BODY MASS INDEX (BMI) OF 45.0 TO 49.9 IN ADULT, UNSPECIFIED OBESITY TYPE: ICD-10-CM

## 2024-05-08 PROCEDURE — 1159F MED LIST DOCD IN RCRD: CPT | Mod: CPTII,S$GLB,, | Performed by: FAMILY MEDICINE

## 2024-05-08 PROCEDURE — G2211 COMPLEX E/M VISIT ADD ON: HCPCS | Mod: S$GLB,,, | Performed by: FAMILY MEDICINE

## 2024-05-08 PROCEDURE — 3074F SYST BP LT 130 MM HG: CPT | Mod: CPTII,S$GLB,, | Performed by: FAMILY MEDICINE

## 2024-05-08 PROCEDURE — 99215 OFFICE O/P EST HI 40 MIN: CPT | Mod: S$GLB,,, | Performed by: FAMILY MEDICINE

## 2024-05-08 PROCEDURE — 3008F BODY MASS INDEX DOCD: CPT | Mod: CPTII,S$GLB,, | Performed by: FAMILY MEDICINE

## 2024-05-08 PROCEDURE — 99999 PR PBB SHADOW E&M-EST. PATIENT-LVL IV: CPT | Mod: PBBFAC,,, | Performed by: FAMILY MEDICINE

## 2024-05-08 PROCEDURE — 3078F DIAST BP <80 MM HG: CPT | Mod: CPTII,S$GLB,, | Performed by: FAMILY MEDICINE

## 2024-05-08 NOTE — PATIENT INSTRUCTIONS
Let us go ahead and stay on the Ozempic, but only do 1/2 the dose.  You will need to count how many clicks it takes to get to the 1 mg marker.  Then, reset the pin and only count long term up.      You will also need to go on [employer] and search for replacement needles as you will need some additional to use during the month.      If you feel better on the 1/2 dose, let us just stay on that for awhile.    If you still feel extremely nauseous, vomiting, diarrhea, etc., then we may just need to come off the medication completely and discuss alternatives.    In the meantime, you can certainly get a B12 supplement over-the-counter.  That can help with energy levels.    Continue to eat a healthy diet.  Be careful with portion sizes.  Includes lots of fresh fruits, vegetables, whole grains, lean proteins.  See info below.    Keep hydrated.  Be sure to drink at least 8-10, 8 oz, glasses of water every day.    Stay active.  Try to do some sort of physical activity every day.  Nothing outrageous, just try walking for 10-15 minutes each day.

## 2024-05-08 NOTE — PROGRESS NOTES
"    [facility]       [address] [place] [address]      Phone: [phone]      Fax: [phone]    Loy Lira MD                Office Visit  05/08/2024        Subjective      HPI:  Basilio Jain is a 43 y.o. male presents today in clinic for "Follow-up  ."     43-year-old gentleman presents today to discuss multiple issues.    Overall, doing okay.  No acute complaints.  No chest pain, shortness on breath.  No fever, chills, body aches.  No coughing, sneezing, URI type symptoms.  No urinary issues.      Has been working on weight loss.  Previously, was using Ozempic 1 mg weekly.  Tolerated it fine.  Lost over 50 lb with.  Weight has plateaued and he was stuck at around 316 lb.  His goal was to get under 300.  Previous visit, we tried increasing the dose to 1.7 mg.  [facility].  We are not sure why insurance was continuing to pay for the Ozempic because he has not diabetic.  This is why we use [facility].  When he fill the prescription,  he started using 35 units (1.7 mg) weekly.  Made him feel really bad.  Had some nausea, one episode of vomiting.  Had an episode of diarrhea and had an accident because he could not get to the restroom in time.  Overall just made himself feel lousy.  Instead, he stopped the medication.  Nausea, vomiting, diarrhea improved.  After a couple of weeks off of the medication, he restarted the Ozempic that he had at home.  Pharmacy kept sending the medicine, so he was just stocking it up.  When he restarted the 1 mg dose, the nausea returned, but not as bad as before.  Now, when he uses his CPAP, if he feels bloated, sick.  He did a trial without it, and felt a little better, but not significantly.    Has hypertension.  Still not on any medications.  Working on diet, exercise, weight loss.  Blood pressure has been even better since he lost additional weight.    Also has issues with asthma.  " "Currently being managed by his ENT.  Was taking Dupixent regularly, but they recently discontinued it.  Also supplements with Xyzal, as needed.  Has a compounded spray that he also uses.      PMH:  HTN.  Pre DM.  Asthma.  Hiatal hernia/GERD.    PSH: Nasal surgery.  EGD.    F MH:  Obesity.  DM.  Grandmother had unknown cancer.    Allergies:  PCN-as a child.  Aspirin caused eye swelling.  Doxycycline caused hives.  Losartan made his feet and legs swell.  Social:  Works in [employer].  Owns two day cares.  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    T: Denies   A: Occasionally/rarely   D:  Denies    Exercise:  Walks on a treadmill 2-3 times per week.  Started lifting weights.  But    ENT:  Dr. Fabio Garza        The following were updated and reviewed by myself in the chart: medications, past medical history, past surgical history, family history, social history, and allergies.     Medications:  Current Outpatient Medications on File Prior to Visit   Medication Sig Dispense Refill    INV semaglutide/placebo injection Semaglutide 5mg / Cyanocobalamin 0.5mg / 1 mL - Inject 35 units (0.35 mL) SQ every 7 days - Disp: 2.5 mL vial - Please disp with U-100 syringes and 5/32" 32g needles 0.5cc. 2.5 mL 3    omeprazole (PRILOSEC) 40 MG capsule Take 1 capsule (40 mg total) by mouth once daily. 90 capsule 3    ondansetron (ZOFRAN-ODT) 4 MG TbDL Take 1 tablet (4 mg total) by mouth every 6 (six) hours as needed (nausea). 20 tablet 3    semaglutide (OZEMPIC) 1 mg/dose (4 mg/3 mL) Inject 1 mg into the skin every 7 days. 3 mL 11    XHANCE 93 mcg/actuation AerB by Each Nostril route.      [DISCONTINUED] clobetasoL (TEMOVATE) 0.05 % external solution Apply topically 2 (two) times daily. 50 mL 3    [DISCONTINUED] DUPIXENT  mg/2 mL PnIj Inject into the skin.      [DISCONTINUED] semaglutide, weight loss, (WEGOVY) 1.7 mg/0.75 mL PnIj Inject 1.7 mg into the skin every 7 days. 3 mL 11     Current Facility-Administered " Medications on File Prior to Visit   Medication Dose Route Frequency Provider Last Rate Last Admin    EPINEPHrine (EPIPEN) 0.3 mg/0.3 mL pen injection 0.3 mg  0.3 mg Intramuscular PRN Jarod Wilson MD            PMHx:  Past Medical History:   Diagnosis Date    Asthma     Sleep apnea, unspecified       Patient Active Problem List    Diagnosis Date Noted    COVID-19 07/30/2021    ALDO (generalized anxiety disorder) 03/08/2021    Status post nasal surgery 03/05/2021    Immunization deficiency 01/05/2021    Pre-diabetes 01/05/2021    Essential hypertension 12/29/2020    Snores 12/29/2020    Morbid obesity with BMI of 50.0-59.9, adult 01/03/2018        PSHx:  Past Surgical History:   Procedure Laterality Date    EYE SURGERY  2004    Lasik    HERNIA REPAIR      NASAL SEPTOPLASTY  2021    WISDOM TOOTH EXTRACTION      X 4        FHx:  Family History   Problem Relation Name Age of Onset    Hypertension Mother Lakisha Jain     Atrial fibrillation Mother Lakisha Jain     Hypothyroidism Mother Lakisha Jain     Diabetes Mother Lakisha Jain     Hypertension Father      ADD / ADHD Sister          Social:  Social History     Socioeconomic History    Marital status:    Tobacco Use    Smoking status: Never    Smokeless tobacco: Never   Substance and Sexual Activity    Alcohol use: Yes     Comment: Occasional    Drug use: No    Sexual activity: Yes     Partners: Female     Birth control/protection: Other-see comments     Comment: Spouse had hysteroctomy     Social Determinants of Health     Financial Resource Strain: Patient Declined (9/7/2023)    Overall Financial Resource Strain (CARDIA)     Difficulty of Paying Living Expenses: Patient declined   Food Insecurity: Patient Declined (9/7/2023)    Hunger Vital Sign     Worried About Running Out of Food in the Last Year: Patient declined     Ran Out of Food in the Last Year: Patient declined   Transportation Needs: Patient Declined (9/7/2023)    PRAPARE - Transportation     Lack  "of Transportation (Medical): Patient declined     Lack of Transportation (Non-Medical): Patient declined   Physical Activity: Unknown (9/7/2023)    Exercise Vital Sign     Days of Exercise per Week: 0 days   Stress: No Stress Concern Present (9/7/2023)    [employer] - Occupational Stress Questionnaire     Feeling of Stress : Not at all   Housing Stability: Unknown (9/7/2023)    Housing Stability Vital Sign     Unable to Pay for Housing in the Last Year: Patient refused     Unstable Housing in the Last Year: Patient refused        Allergies:  Review of patient's allergies indicates:   Allergen Reactions    Asa [aspirin] Swelling    Doxycycline Hives and Rash    Losartan Edema    Pcn [penicillins] Anaphylaxis        ROS:  Review of Systems   Constitutional:  Positive for activity change. Negative for unexpected weight change.   HENT:  Negative for hearing loss, rhinorrhea and trouble swallowing.    Eyes:  Negative for discharge and visual disturbance.   Respiratory:  Negative for chest tightness and wheezing.    Cardiovascular:  Negative for chest pain and palpitations.   Gastrointestinal:  Negative for blood in stool, constipation, diarrhea and vomiting.   Endocrine: Negative for polydipsia and polyuria.   Genitourinary:  Negative for difficulty urinating, hematuria and urgency.   Musculoskeletal:  Negative for arthralgias, joint swelling and neck pain.   Neurological:  Negative for weakness and headaches.   Psychiatric/Behavioral:  Negative for confusion and dysphoric mood.           Objective      /78   Pulse 86   Temp 98 °F (36.7 °C)   Ht 5' 9" (1.753 m)   Wt (!) 149.9 kg (330 lb 7.5 oz)   SpO2 97%   BMI 48.80 kg/m²   Ht Readings from Last 3 Encounters:   05/08/24 5' 9" (1.753 m)   11/08/23 5' 9" (1.753 m)   04/27/23 5' 9" (1.753 m)     Wt Readings from Last 3 Encounters:   05/08/24 (!) 149.9 kg (330 lb 7.5 oz)   11/08/23 (!) 143.7 kg (316 lb 12.8 oz)   04/27/23 (!) 151.5 " kg (334 lb)       PHYSICAL EXAM:  Physical Exam  Vitals and nursing note reviewed.   Constitutional:       General: He is not in acute distress.     Appearance: Normal appearance.   HENT:      Head: Normocephalic and atraumatic.      Right Ear: Tympanic membrane, ear canal and external ear normal.      Left Ear: Tympanic membrane, ear canal and external ear normal.      Nose: Nose normal. No congestion or rhinorrhea.      Mouth/Throat:      Mouth: Mucous membranes are moist.      Pharynx: Oropharynx is clear. No oropharyngeal exudate or posterior oropharyngeal erythema.   Eyes:      Extraocular Movements: Extraocular movements intact.      Conjunctiva/sclera: Conjunctivae normal.      Pupils: Pupils are equal, round, and reactive to light.   Cardiovascular:      Rate and Rhythm: Normal rate and regular rhythm.   Pulmonary:      Effort: Pulmonary effort is normal.      Breath sounds: No wheezing, rhonchi or rales.   Musculoskeletal:         General: Normal range of motion.      Cervical back: Normal range of motion.   Lymphadenopathy:      Cervical: No cervical adenopathy.   Skin:     General: Skin is warm and dry.   Neurological:      General: No focal deficit present.      Mental Status: He is alert.              LABS / IMAGING:  No results found for this or any previous visit (from the past 4368 hour(s)).      Assessment    1. Prediabetes    2. Primary hypertension    3. Class 3 severe obesity with serious comorbidity and body mass index (BMI) of 45.0 to 49.9 in adult, unspecified obesity type    4. Encounter for screening for malignant neoplasm of prostate          Plan    Basilio was seen today for follow-up.    Diagnoses and all orders for this visit:    Prediabetes  -     Hemoglobin A1C; Future    Primary hypertension  -     Lipid Panel; Future  -     TSH; Future  -     Comprehensive Metabolic Panel; Future  -     CBC Auto Differential; Future    Class 3 severe obesity with serious comorbidity and body mass  index (BMI) of 45.0 to 49.9 in adult, unspecified obesity type    Encounter for screening for malignant neoplasm of prostate  -     PSA, Screening; Future    Physically, everything looks good today.      Orders placed for screening blood work to be done prior to next visit.      I worry that he has just gotten to the point where his body can not tolerate the semaglutide.  The gastric slowing/gastroparesis maybe contributing to his nausea, vomiting symptoms.  Recommended he back off even further down to 0.5 mg weekly.  He can either use 10 units from [facility] or count the clicks in his Ozempic an only do 1/2 of a dose.  If he uses the Ozempic, he will need to get additional needles from [facility] as they only have enough in the box for four weeks and the prescriptions will last eight weeks instead.  If he continues to have these symptoms even at 0.5 mg, we may just need to discontinue the medication completely.      As an alternative, next visit, we could try switching to ZepBound?    Recommended he use his CPAP nightly.  CPAP not likely causing the bloating.    FOLLOW-UP:  Follow up in about 6 months (around 11/8/2024) for annual exam, labs 1 week prior.    I spent a total of 45 minutes face to face and non-face to face on the date of this visit.This includes time preparing to see the patient (eg, review of tests, notes), obtaining and/or reviewing additional history from an independent historian and/or outside medical records, documenting clinical information in the electronic health record, independently interpreting results and/or communicating results to the patient/family/caregiver, or care coordinator.  Visit today included increased complexity associated with the care of the episodic problem addressed and managing the longitudinal care of the patient due to the serious and/or complex managed problem(s).    Signed by:  Loy Lira MD

## 2024-05-11 DIAGNOSIS — R73.09 ELEVATED GLUCOSE: ICD-10-CM

## 2024-05-12 NOTE — TELEPHONE ENCOUNTER
No care due was identified.  [facility] Embedded Care Due Messages. Reference number: [id number].   5/11/2024 9:45:06 PM CDT

## 2024-05-12 NOTE — TELEPHONE ENCOUNTER
Refill Routing Note   Medication(s) are not appropriate for processing by [facility] for the following reason(s):        Required labs outdated    [facility] action(s):  Defer               Appointments  past 12m or future 3m with PCP    Date Provider   Last Visit   5/8/2024 Loy Lira MD   Next Visit   Visit date not found Loy Lira MD   ED visits in past 90 days: 0        Note composed:2:07 AM 05/12/2024

## 2024-05-13 RX ORDER — SEMAGLUTIDE 1.34 MG/ML
1 INJECTION, SOLUTION SUBCUTANEOUS
Qty: 3 ML | Refills: 11 | Status: SHIPPED | OUTPATIENT
Start: 2024-05-13 | End: 2025-05-13

## 2024-08-05 ENCOUNTER — TELEPHONE (OUTPATIENT)
Dept: PRIMARY CARE CLINIC | Facility: CLINIC | Age: 44
End: 2024-08-05
Payer: COMMERCIAL

## 2024-08-06 ENCOUNTER — TELEPHONE (OUTPATIENT)
Dept: PHARMACY | Facility: CLINIC | Age: 44
End: 2024-08-06
Payer: COMMERCIAL

## 2024-08-27 RX ORDER — ONDANSETRON 4 MG/1
4 TABLET, ORALLY DISINTEGRATING ORAL EVERY 6 HOURS PRN
Qty: 20 TABLET | Refills: 3 | Status: SHIPPED | OUTPATIENT
Start: 2024-08-27

## 2024-08-27 NOTE — TELEPHONE ENCOUNTER
Care Due:                  Date            Visit Type   Department     Provider  --------------------------------------------------------------------------------                                MYCHART                              FOLLOWUP/OF  [facility]  Last Visit: 05-      [facility] VISIT   JONATHAN Lira                              EP -                              PRIMARY      [facility]  Next Visit: 11-      CARE ([facility])   JONATHAN Lira                                                            Last  Test          Frequency    Reason                     Performed    Due Date  --------------------------------------------------------------------------------    HBA1C.......  6 months...  OZEMPIC..................  11- 05-    [facility] [employer] Embedded Care Due Messages. Reference number: [id number].   8/27/2024 10:19:16 AM CDT

## 2024-08-27 NOTE — TELEPHONE ENCOUNTER
Refill Routing Note   Medication(s) are not appropriate for processing by [facility] for the following reason(s):        Outside of protocol    [facility] action(s):  Route               Appointments  past 12m or future 3m with PCP    Date Provider   Last Visit   5/8/2024 Loy Lira MD   Next Visit   11/14/2024 Loy Lira MD   ED visits in past 90 days: 0        Note composed:12:40 PM 08/27/2024

## 2024-09-07 ENCOUNTER — TELEPHONE (OUTPATIENT)
Dept: PHARMACY | Facility: CLINIC | Age: 44
End: 2024-09-07
Payer: COMMERCIAL

## 2024-09-07 NOTE — TELEPHONE ENCOUNTER
"[facility]/Population Health Chart Review & Patient Outreach Details For Medication Adherence Project    Reason for Outreach Encounter: 3rd Party payor non-compliance report ([employer], [employer], C, etc)  2.  Patient Outreach Method: Spongecellhart message  3.   Medication in question: ozempic   LAST FILLED: 6/19/24 for 28 day supply  Diabetes Medications               INV semaglutide/placebo injection Semaglutide 5mg / Cyanocobalamin 0.5mg / 1 mL - Inject 35 units (0.35 mL) SQ every 7 days - Disp: 2.5 mL vial - Please disp with U-100 syringes and 5/32" 32g needles 0.5cc.    OZEMPIC 1 mg/dose (4 mg/3 mL) INJECT 1 MG INTO THE SKIN EVERY 7 DAYS.              4.  Reviewed and or Updates Made To: Patient Chart  5. Outreach Outcomes and/or actions taken: Sent inquiry to patient: Waiting for response.   "

## 2024-10-29 ENCOUNTER — TELEPHONE (OUTPATIENT)
Dept: PHARMACY | Facility: CLINIC | Age: 44
End: 2024-10-29
Payer: COMMERCIAL

## 2024-11-13 ENCOUNTER — LAB VISIT (OUTPATIENT)
Dept: LAB | Facility: HOSPITAL | Age: 44
End: 2024-11-13
Attending: FAMILY MEDICINE
Payer: COMMERCIAL

## 2024-11-13 ENCOUNTER — PATIENT MESSAGE (OUTPATIENT)
Dept: PRIMARY CARE CLINIC | Facility: CLINIC | Age: 44
End: 2024-11-13
Payer: COMMERCIAL

## 2024-11-13 DIAGNOSIS — I10 PRIMARY HYPERTENSION: ICD-10-CM

## 2024-11-13 DIAGNOSIS — R73.03 PREDIABETES: ICD-10-CM

## 2024-11-13 DIAGNOSIS — Z12.5 ENCOUNTER FOR SCREENING FOR MALIGNANT NEOPLASM OF PROSTATE: ICD-10-CM

## 2024-11-13 LAB
ALBUMIN SERPL BCP-MCNC: 4 G/DL (ref 3.5–5.2)
ALP SERPL-CCNC: 71 U/L (ref 40–150)
ALT SERPL W/O P-5'-P-CCNC: 20 U/L (ref 10–44)
ANION GAP SERPL CALC-SCNC: 9 MMOL/L (ref 8–16)
AST SERPL-CCNC: 18 U/L (ref 10–40)
BASOPHILS # BLD AUTO: 0.14 K/UL (ref 0–0.2)
BASOPHILS NFR BLD: 1.5 % (ref 0–1.9)
BILIRUB SERPL-MCNC: 0.6 MG/DL (ref 0.1–1)
BUN SERPL-MCNC: 13 MG/DL (ref 6–20)
CALCIUM SERPL-MCNC: 10 MG/DL (ref 8.7–10.5)
CHLORIDE SERPL-SCNC: 109 MMOL/L (ref 95–110)
CHOLEST SERPL-MCNC: 158 MG/DL (ref 120–199)
CHOLEST/HDLC SERPL: 3.3 {RATIO} (ref 2–5)
CO2 SERPL-SCNC: 25 MMOL/L (ref 23–29)
COMPLEXED PSA SERPL-MCNC: 0.82 NG/ML (ref 0–4)
CREAT SERPL-MCNC: 0.9 MG/DL (ref 0.5–1.4)
DIFFERENTIAL METHOD BLD: ABNORMAL
EOSINOPHIL # BLD AUTO: 0.8 K/UL (ref 0–0.5)
EOSINOPHIL NFR BLD: 8.9 % (ref 0–8)
ERYTHROCYTE [DISTWIDTH] IN BLOOD BY AUTOMATED COUNT: 13.4 % (ref 11.5–14.5)
EST. GFR  (NO RACE VARIABLE): >60 ML/MIN/1.73 M^2
ESTIMATED AVG GLUCOSE: 105 MG/DL (ref 68–131)
GLUCOSE SERPL-MCNC: 103 MG/DL (ref 70–110)
HBA1C MFR BLD: 5.3 % (ref 4–5.6)
HCT VFR BLD AUTO: 48.5 % (ref 40–54)
HDLC SERPL-MCNC: 48 MG/DL (ref 40–75)
HDLC SERPL: 30.4 % (ref 20–50)
HGB BLD-MCNC: 15.7 G/DL (ref 14–18)
IMM GRANULOCYTES # BLD AUTO: 0.02 K/UL (ref 0–0.04)
IMM GRANULOCYTES NFR BLD AUTO: 0.2 % (ref 0–0.5)
LDLC SERPL CALC-MCNC: 91.2 MG/DL (ref 63–159)
LYMPHOCYTES # BLD AUTO: 2.5 K/UL (ref 1–4.8)
LYMPHOCYTES NFR BLD: 26.7 % (ref 18–48)
MCH RBC QN AUTO: 30.1 PG (ref 27–31)
MCHC RBC AUTO-ENTMCNC: 32.4 G/DL (ref 32–36)
MCV RBC AUTO: 93 FL (ref 82–98)
MONOCYTES # BLD AUTO: 0.9 K/UL (ref 0.3–1)
MONOCYTES NFR BLD: 9.1 % (ref 4–15)
NEUTROPHILS # BLD AUTO: 5 K/UL (ref 1.8–7.7)
NEUTROPHILS NFR BLD: 53.6 % (ref 38–73)
NONHDLC SERPL-MCNC: 110 MG/DL
NRBC BLD-RTO: 0 /100 WBC
PLATELET # BLD AUTO: 368 K/UL (ref 150–450)
PMV BLD AUTO: 11 FL (ref 9.2–12.9)
POTASSIUM SERPL-SCNC: 4.1 MMOL/L (ref 3.5–5.1)
PROT SERPL-MCNC: 7.3 G/DL (ref 6–8.4)
RBC # BLD AUTO: 5.21 M/UL (ref 4.6–6.2)
SODIUM SERPL-SCNC: 143 MMOL/L (ref 136–145)
TRIGL SERPL-MCNC: 94 MG/DL (ref 30–150)
TSH SERPL DL<=0.005 MIU/L-ACNC: 1.5 UIU/ML (ref 0.4–4)
WBC # BLD AUTO: 9.32 K/UL (ref 3.9–12.7)

## 2024-11-13 PROCEDURE — 83036 HEMOGLOBIN GLYCOSYLATED A1C: CPT | Performed by: FAMILY MEDICINE

## 2024-11-13 PROCEDURE — 84443 ASSAY THYROID STIM HORMONE: CPT | Performed by: FAMILY MEDICINE

## 2024-11-13 PROCEDURE — 80053 COMPREHEN METABOLIC PANEL: CPT | Performed by: FAMILY MEDICINE

## 2024-11-13 PROCEDURE — 36415 COLL VENOUS BLD VENIPUNCTURE: CPT | Mod: PN | Performed by: FAMILY MEDICINE

## 2024-11-13 PROCEDURE — 85025 COMPLETE CBC W/AUTO DIFF WBC: CPT | Performed by: FAMILY MEDICINE

## 2024-11-13 PROCEDURE — 84153 ASSAY OF PSA TOTAL: CPT | Performed by: FAMILY MEDICINE

## 2024-11-13 PROCEDURE — 80061 LIPID PANEL: CPT | Performed by: FAMILY MEDICINE

## 2024-11-14 ENCOUNTER — LAB VISIT (OUTPATIENT)
Dept: LAB | Facility: HOSPITAL | Age: 44
End: 2024-11-14
Attending: FAMILY MEDICINE
Payer: COMMERCIAL

## 2024-11-14 ENCOUNTER — OFFICE VISIT (OUTPATIENT)
Dept: PRIMARY CARE CLINIC | Facility: CLINIC | Age: 44
End: 2024-11-14
Payer: COMMERCIAL

## 2024-11-14 VITALS
SYSTOLIC BLOOD PRESSURE: 120 MMHG | HEART RATE: 93 BPM | DIASTOLIC BLOOD PRESSURE: 82 MMHG | BODY MASS INDEX: 43.23 KG/M2 | WEIGHT: 291.88 LBS | HEIGHT: 69 IN

## 2024-11-14 DIAGNOSIS — Z00.00 ANNUAL PHYSICAL EXAM: Primary | ICD-10-CM

## 2024-11-14 DIAGNOSIS — Z20.2 EXPOSURE TO STD: ICD-10-CM

## 2024-11-14 DIAGNOSIS — L65.9 HAIR LOSS: ICD-10-CM

## 2024-11-14 DIAGNOSIS — G47.9 SLEEP DIFFICULTIES: ICD-10-CM

## 2024-11-14 DIAGNOSIS — I10 PRIMARY HYPERTENSION: ICD-10-CM

## 2024-11-14 PROCEDURE — 3044F HG A1C LEVEL LT 7.0%: CPT | Mod: CPTII,S$GLB,, | Performed by: FAMILY MEDICINE

## 2024-11-14 PROCEDURE — 87591 N.GONORRHOEAE DNA AMP PROB: CPT | Performed by: FAMILY MEDICINE

## 2024-11-14 PROCEDURE — 3008F BODY MASS INDEX DOCD: CPT | Mod: CPTII,S$GLB,, | Performed by: FAMILY MEDICINE

## 2024-11-14 PROCEDURE — 99396 PREV VISIT EST AGE 40-64: CPT | Mod: S$GLB,,, | Performed by: FAMILY MEDICINE

## 2024-11-14 PROCEDURE — 3074F SYST BP LT 130 MM HG: CPT | Mod: CPTII,S$GLB,, | Performed by: FAMILY MEDICINE

## 2024-11-14 PROCEDURE — 99999 PR PBB SHADOW E&M-EST. PATIENT-LVL IV: CPT | Mod: PBBFAC,,, | Performed by: FAMILY MEDICINE

## 2024-11-14 PROCEDURE — 1159F MED LIST DOCD IN RCRD: CPT | Mod: CPTII,S$GLB,, | Performed by: FAMILY MEDICINE

## 2024-11-14 PROCEDURE — 3079F DIAST BP 80-89 MM HG: CPT | Mod: CPTII,S$GLB,, | Performed by: FAMILY MEDICINE

## 2024-11-14 RX ORDER — CLONAZEPAM 0.5 MG/1
TABLET ORAL
COMMUNITY
Start: 2024-10-28 | End: 2024-11-14 | Stop reason: SDUPTHER

## 2024-11-14 RX ORDER — TRAZODONE HYDROCHLORIDE 50 MG/1
100 TABLET ORAL NIGHTLY PRN
Qty: 180 TABLET | Refills: 3 | Status: SHIPPED | OUTPATIENT
Start: 2024-11-14 | End: 2025-11-14

## 2024-11-14 RX ORDER — FINASTERIDE 1 MG/1
1 TABLET, FILM COATED ORAL DAILY
Qty: 90 TABLET | Refills: 3 | Status: SHIPPED | OUTPATIENT
Start: 2024-11-14

## 2024-11-14 RX ORDER — CLONAZEPAM 0.5 MG/1
0.5 TABLET ORAL NIGHTLY PRN
Qty: 30 TABLET | Refills: 0 | Status: SHIPPED | OUTPATIENT
Start: 2024-11-14

## 2024-11-14 RX ORDER — DUPILUMAB 300 MG/2ML
INJECTION, SOLUTION SUBCUTANEOUS
COMMUNITY
Start: 2024-11-06

## 2024-11-14 NOTE — PROGRESS NOTES
"    [facility]       [address] [place] [address]      Phone: [phone]      Fax: [phone]    Loy Lira MD                Office Visit  11/14/2024        Subjective      HPI:  Basilio Jain is a 44 y.o. male presents today in clinic for "Annual Exam  ."     44-year-old gentleman presents today for annual wellness exam.    Overall, doing relatively okay.  No chest pain, shortness on breath.  No fever, chills, body aches.  No coughing, sneezing, URI type symptoms.  Appetite normal.  Bowel movements normal.  No urinary issues.    Still working on weight loss.  In the past, used Ozempic, semaglutide.  Was able to lose some weight, but plateaued.  Then, when he tried restarting the medicine, it made him feel ill.  Ultimately, he discontinued it.  Since then, has been working on diet.  Watching portion sizes.  Making healthier choices.    Unfortunately, has been under a bit of stress lately.  Has always been very busy with work, but lately has been having some family issues.  He and his wife have .  Because of the extra stress, he has not been eating as much.  He was down about 40 lb since last visit.  Intends to keep it off.    He was concerned that he may have been exposed to an STD.  His wife seeing someone else.  He would like to get tested.    All the added stress has a affected his sleep.  Has trouble falling asleep.  When he does fall asleep, he tosses and turns.  Awhile back, his eyes were swelling, getting puffy because of the sleep issues.  He saw his ENT, who started him on some Klonopin to use as needed.  States he does not use it every night, just when he really has a hard time falling asleep.  Initially, one tablet would work fine.  Lately, has been using two.  Open to other options.      Has been seeing a counselor to help him through this time.  Has been getting benefit from it.    Has hypertension.  Still not on any " "medications.  As above, has been working on diet, exercise, weight loss.  Blood pressure at home typically runs 115-120/78-82.  Does tend to be elevated when he goes to the doctor's office.    Also has issues with asthma.  Currently being managed by his ENT.  Was taking Dupixent regularly, but they recently discontinued it.  Also supplements with Xyzal, as needed.  Has a compounded spray that he also uses.      Recently started getting PRP injections for hair growth.  Sees [facility].  Also using OTC Nutrafoil .    PMH:  HTN.  Pre DM.  Asthma.  Hiatal hernia/GERD.    PSH: Nasal surgery.  EGD.    F MH:  Obesity.  DM.  Grandmother had unknown cancer.    Allergies:  PCN-as a child.  Aspirin caused eye swelling.  Doxycycline caused hives.  Losartan made his feet and legs swell.  Social:  Works in [employer].  Owns two day cares.  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    T: Denies   A: Occasionally/rarely   D:  Denies    Exercise:  Walks on a treadmill 2-3 times per week.  Started lifting weights.  But    ENT:  Dr. Fabio Garza  Counselor: Liane benjamin        The following were updated and reviewed by myself in the chart: medications, past medical history, past surgical history, family history, social history, and allergies.     Medications:  Current Outpatient Medications on File Prior to Visit   Medication Sig Dispense Refill    DUPIXENT  mg/2 mL PnIj       XHANCE 93 mcg/actuation AerB by Each Nostril route.      [DISCONTINUED] clonazePAM (KLONOPIN) 0.5 MG tablet Take 1 tablet by mouth EVERY NIGHT AT BEDTIME MAY CAUSE DROWSINESS      omeprazole (PRILOSEC) 40 MG capsule Take 1 capsule (40 mg total) by mouth once daily. 90 capsule 3    [DISCONTINUED] INV semaglutide/placebo injection Semaglutide 5mg / Cyanocobalamin 0.5mg / 1 mL - Inject 35 units (0.35 mL) SQ every 7 days - Disp: 2.5 mL vial - Please disp with U-100 syringes and 5/32" 32g needles 0.5cc. 2.5 mL 3    [DISCONTINUED] ondansetron " (ZOFRAN-ODT) 4 MG TbDL Take 1 tablet (4 mg total) by mouth every 6 (six) hours as needed (nausea). 20 tablet 3    [DISCONTINUED] OZEMPIC 1 mg/dose (4 mg/3 mL) INJECT 1 MG INTO THE SKIN EVERY 7 DAYS. 3 mL 11     Current Facility-Administered Medications on File Prior to Visit   Medication Dose Route Frequency Provider Last Rate Last Admin    EPINEPHrine (EPIPEN) 0.3 mg/0.3 mL pen injection 0.3 mg  0.3 mg Intramuscular PRN Jarod Wilson MD            PMHx:  Past Medical History:   Diagnosis Date    Asthma     Sleep apnea, unspecified       Patient Active Problem List    Diagnosis Date Noted    COVID-19 07/30/2021    ALDO (generalized anxiety disorder) 03/08/2021    Status post nasal surgery 03/05/2021    Immunization deficiency 01/05/2021    Pre-diabetes 01/05/2021    Essential hypertension 12/29/2020    Snores 12/29/2020    Morbid obesity with BMI of 50.0-59.9, adult 01/03/2018        PSHx:  Past Surgical History:   Procedure Laterality Date    EYE SURGERY  2004    Lasik    HERNIA REPAIR      NASAL SEPTOPLASTY  2021    WISDOM TOOTH EXTRACTION      X 4        FHx:  Family History   Problem Relation Name Age of Onset    Hypertension Mother Lakisha Jain     Atrial fibrillation Mother Lakisha Jain     Hypothyroidism Mother Lakisha Jain     Diabetes Mother Lakisha Jain     Hypertension Father      ADD / ADHD Sister          Social:  Social History     Socioeconomic History    Marital status:    Tobacco Use    Smoking status: Never    Smokeless tobacco: Never   Substance and Sexual Activity    Alcohol use: Yes     Comment: Occasional    Drug use: No    Sexual activity: Yes     Partners: Female     Birth control/protection: Other-see comments     Comment: Spouse had hysteroctomy     Social Drivers of Health     Financial Resource Strain: Patient Declined (9/7/2023)    Overall Financial Resource Strain (CARDIA)     Difficulty of Paying Living Expenses: Patient declined   Food Insecurity: Patient Declined (9/7/2023)     "Hunger Vital Sign     Worried About Running Out of Food in the Last Year: Patient declined     Ran Out of Food in the Last Year: Patient declined   Transportation Needs: Patient Declined (9/7/2023)    PRAPARE - Transportation     Lack of Transportation (Medical): Patient declined     Lack of Transportation (Non-Medical): Patient declined   Physical Activity: Unknown (9/7/2023)    Exercise Vital Sign     Days of Exercise per Week: 0 days   Stress: No Stress Concern Present (9/7/2023)    [employer] - Occupational Stress Questionnaire     Feeling of Stress : Not at all   Housing Stability: Unknown (9/7/2023)    Housing Stability Vital Sign     Unable to Pay for Housing in the Last Year: Patient refused     Unstable Housing in the Last Year: Patient refused        Allergies:  Review of patient's allergies indicates:   Allergen Reactions    Asa [aspirin] Swelling    Doxycycline Hives and Rash    Losartan Edema    Pcn [penicillins] Anaphylaxis        ROS:  Review of Systems   Constitutional:  Negative for activity change, appetite change, chills and fever.   HENT:  Negative for congestion, postnasal drip, rhinorrhea, sore throat and trouble swallowing.    Respiratory:  Negative for cough and shortness of breath.    Cardiovascular:  Negative for chest pain and palpitations.   Gastrointestinal:  Negative for abdominal pain, constipation, diarrhea, nausea and vomiting.   Genitourinary:  Negative for difficulty urinating.   Musculoskeletal:  Negative for arthralgias and myalgias.   Skin:  Negative for color change and rash.   Neurological:  Negative for headaches.   Psychiatric/Behavioral:  Positive for sleep disturbance.    All other systems reviewed and are negative.         Objective      /82   Pulse 93   Ht 5' 9" (1.753 m)   Wt 132.4 kg (291 lb 14.2 oz)   BMI 43.10 kg/m²   Ht Readings from Last 3 Encounters:   11/14/24 5' 9" (1.753 m)   05/08/24 5' 9" (1.753 m)   11/08/23 5' 9" " (1.753 m)     Wt Readings from Last 3 Encounters:   11/14/24 132.4 kg (291 lb 14.2 oz)   05/08/24 (!) 149.9 kg (330 lb 7.5 oz)   11/08/23 (!) 143.7 kg (316 lb 12.8 oz)       PHYSICAL EXAM:  Physical Exam  Vitals and nursing note reviewed.   Constitutional:       General: He is not in acute distress.     Appearance: Normal appearance.   HENT:      Head: Normocephalic and atraumatic.      Right Ear: Tympanic membrane, ear canal and external ear normal.      Left Ear: Tympanic membrane, ear canal and external ear normal.      Nose: Nose normal. No congestion or rhinorrhea.      Mouth/Throat:      Mouth: Mucous membranes are moist.      Pharynx: Oropharynx is clear. No oropharyngeal exudate or posterior oropharyngeal erythema.   Eyes:      Extraocular Movements: Extraocular movements intact.      Conjunctiva/sclera: Conjunctivae normal.      Pupils: Pupils are equal, round, and reactive to light.   Cardiovascular:      Rate and Rhythm: Normal rate and regular rhythm.   Pulmonary:      Effort: Pulmonary effort is normal.      Breath sounds: No wheezing, rhonchi or rales.   Musculoskeletal:         General: Normal range of motion.      Cervical back: Normal range of motion.   Lymphadenopathy:      Cervical: No cervical adenopathy.   Skin:     General: Skin is warm and dry.   Neurological:      General: No focal deficit present.      Mental Status: He is alert.              LABS / IMAGING:  Recent Results (from the past 26 weeks)   Lipid Panel    Collection Time: 11/13/24  7:19 AM   Result Value Ref Range    Cholesterol 158 120 - 199 mg/dL    Triglycerides 94 30 - 150 mg/dL    HDL 48 40 - 75 mg/dL    LDL Cholesterol 91.2 63.0 - 159.0 mg/dL    HDL/Cholesterol Ratio 30.4 20.0 - 50.0 %    Total Cholesterol/HDL Ratio 3.3 2.0 - 5.0    Non-HDL Cholesterol 110 mg/dL   TSH    Collection Time: 11/13/24  7:19 AM   Result Value Ref Range    TSH 1.499 0.400 - 4.000 uIU/mL   Comprehensive Metabolic Panel    Collection Time: 11/13/24   7:19 AM   Result Value Ref Range    Sodium 143 136 - 145 mmol/L    Potassium 4.1 3.5 - 5.1 mmol/L    Chloride 109 95 - 110 mmol/L    CO2 25 23 - 29 mmol/L    Glucose 103 70 - 110 mg/dL    BUN 13 6 - 20 mg/dL    Creatinine 0.9 0.5 - 1.4 mg/dL    Calcium 10.0 8.7 - 10.5 mg/dL    Total Protein 7.3 6.0 - 8.4 g/dL    Albumin 4.0 3.5 - 5.2 g/dL    Total Bilirubin 0.6 0.1 - 1.0 mg/dL    Alkaline Phosphatase 71 40 - 150 U/L    AST 18 10 - 40 U/L    ALT 20 10 - 44 U/L    eGFR >60.0 >60 mL/min/1.73 m^2    Anion Gap 9 8 - 16 mmol/L   CBC Auto Differential    Collection Time: 11/13/24  7:19 AM   Result Value Ref Range    WBC 9.32 3.90 - 12.70 K/uL    RBC 5.21 4.60 - 6.20 M/uL    Hemoglobin 15.7 14.0 - 18.0 g/dL    Hematocrit 48.5 40.0 - 54.0 %    MCV 93 82 - 98 fL    MCH 30.1 27.0 - 31.0 pg    MCHC 32.4 32.0 - 36.0 g/dL    RDW 13.4 11.5 - 14.5 %    Platelets 368 150 - 450 K/uL    MPV 11.0 9.2 - 12.9 fL    Immature Granulocytes 0.2 0.0 - 0.5 %    Gran # (ANC) 5.0 1.8 - 7.7 K/uL    Immature Grans (Abs) 0.02 0.00 - 0.04 K/uL    Lymph # 2.5 1.0 - 4.8 K/uL    Mono # 0.9 0.3 - 1.0 K/uL    Eos # 0.8 (H) 0.0 - 0.5 K/uL    Baso # 0.14 0.00 - 0.20 K/uL    nRBC 0 0 /100 WBC    Gran % 53.6 38.0 - 73.0 %    Lymph % 26.7 18.0 - 48.0 %    Mono % 9.1 4.0 - 15.0 %    Eosinophil % 8.9 (H) 0.0 - 8.0 %    Basophil % 1.5 0.0 - 1.9 %    Differential Method Automated    Hemoglobin A1C    Collection Time: 11/13/24  7:19 AM   Result Value Ref Range    Hemoglobin A1C 5.3 4.0 - 5.6 %    Estimated Avg Glucose 105 68 - 131 mg/dL   PSA, Screening    Collection Time: 11/13/24  7:19 AM   Result Value Ref Range    PSA, Screen 0.82 0.00 - 4.00 ng/mL         Assessment    1. Annual physical exam    2. Primary hypertension    3. Sleep difficulties    4. Hair loss    5. Exposure to STD          Plan    Basilio was seen today for annual exam.    Diagnoses and all orders for this visit:    Annual physical exam    Primary hypertension    Sleep difficulties  -      traZODone (DESYREL) 50 MG tablet; Take 2 tablets (100 mg total) by mouth nightly as needed for Insomnia.  -     clonazePAM (KLONOPIN) 0.5 MG tablet; Take 1 tablet (0.5 mg total) by mouth nightly as needed for Anxiety (or sleep).    Hair loss  -     finasteride (PROPECIA) 1 mg tablet; Take 1 tablet (1 mg total) by mouth once daily.    Exposure to STD  -     C. trachomatis/N. gonorrhoeae by AMP DNA [facility]; Urine; Future  -     HIV 1/2 Ag/Ab (4th Gen); Future  -     Treponema Pallidium Antibodies IgG, IgM; Future    Physically, everything looks pretty good.      Reviewed recent blood work with him.  Everything looks fine.      We can get a urine sample today to check for gonorrhea, chlamydia.      We can also place orders to check other STDs (HIV, RPR).  He can come get those done anytime, at his convenience.    Would prefer him to try trazodone for sleep instead of Klonopin.  He can still use the Klonopin, if the trazodone does not help.    Hair loss, we can also supplement with finasteride 1 mg.  If too expensive at the pharmacy, we can always send prescription to [facility].    FOLLOW-UP:  Follow up in about 3 months (around 2/14/2025) for check up, Virtual Visit ok, with me.    I spent a total of 30 minutes face to face and non-face to face on the date of this visit.This includes time preparing to see the patient (eg, review of tests, notes), obtaining and/or reviewing additional history from an independent historian and/or outside medical records, documenting clinical information in the electronic health record, independently interpreting results and/or communicating results to the patient/family/caregiver, or care coordinator.  Visit today included increased complexity associated with the care of the episodic problem addressed and managing the longitudinal care of the patient due to the serious and/or complex managed problem(s).    Signed by:  Loy Lira MD

## 2024-11-14 NOTE — PATIENT INSTRUCTIONS
Physically, everything looks really good today.      All of the blood work we did last week looks great, as well!  Keep up the good work!     Let us get a urine sample today to check for gonorrhea and chlamydia.  Results will be posted to [facility] as soon as they are available.      I will also put some additional orders in to check for other STDs.  These would be a blood test.  If/when you decide to get them done, just come on in and get the blood drawn.  You do not need to be fasting for it.      Keep going through counseling.  That works wonders.      If you in the counselor decide that you should be on a daily medication to help with the anxiety/depression/stress that comes along with everything you are going through, just let me know.  We have several options from which we can choose.      I am sending a refill of the Klonopin to the pharmacy.  I am okay if you use that periodically to help with sleep or stress.  I would rather you try using trazodone first, though.  Start with one tablet at bedtime.  If needed, you can take two, or even three, tablets.  This medicine is less habit-forming then the Klonopin.  If the trazodone does not work after an hour or two, then take a dose of Klonopin.      Keep me posted on the hair growth! I am curious to see how these plasma treatments work.  When you see [facility]erson, tell her I said jose ramon!    If you like, we can supplement with daily finasteride.  I am sending a prescription to the pharmacy.  This can also help regrow hair, but it takes a bit longer.  If the prescription at the pharmacy is expensive, try using [facility] to see if they can give a discount.  If not, let me know because I do have a mail-order pharmacy that can send it out quite cheaply.    Continue to eat a healthy diet.  Be careful with portion sizes.  Includes lots of fresh fruits, vegetables, whole grains, lean proteins.  See info below.    Keep hydrated.  Be sure to drink at least 8-10, 8 oz, glasses of water  every day.    Stay active.  Try to do some sort of physical activity every day.  Nothing outrageous, just try walking for 10-15 minutes each day.

## 2024-11-16 LAB
C TRACH DNA SPEC QL NAA+PROBE: NOT DETECTED
N GONORRHOEA DNA SPEC QL NAA+PROBE: NOT DETECTED

## 2024-11-18 DIAGNOSIS — K21.00 GASTROESOPHAGEAL REFLUX DISEASE WITH ESOPHAGITIS WITHOUT HEMORRHAGE: ICD-10-CM

## 2024-11-18 RX ORDER — OMEPRAZOLE 40 MG/1
40 CAPSULE, DELAYED RELEASE ORAL DAILY
Qty: 90 CAPSULE | Refills: 3 | Status: SHIPPED | OUTPATIENT
Start: 2024-11-18 | End: 2025-11-18

## 2024-11-18 NOTE — TELEPHONE ENCOUNTER
Refill Decision Note   Basilio Jain  is requesting a refill authorization.  Brief Assessment and Rationale for Refill:  Approve     Medication Therapy Plan:         Comments:     Note composed:2:48 PM 11/18/2024             Appointments     Last Visit   11/14/2024 Loy Lira MD   Next Visit   2/14/2025 Loy Lira MD

## 2024-11-18 NOTE — TELEPHONE ENCOUNTER
No care due was identified.  [facility] Embedded Care Due Messages. Reference number: [id number].   11/18/2024 9:05:31 AM CST

## 2025-02-14 ENCOUNTER — OFFICE VISIT (OUTPATIENT)
Dept: PRIMARY CARE CLINIC | Facility: CLINIC | Age: 45
End: 2025-02-14
Payer: COMMERCIAL

## 2025-02-14 ENCOUNTER — PATIENT MESSAGE (OUTPATIENT)
Dept: PRIMARY CARE CLINIC | Facility: CLINIC | Age: 45
End: 2025-02-14

## 2025-02-14 DIAGNOSIS — E66.01 CLASS 3 SEVERE OBESITY WITH SERIOUS COMORBIDITY AND BODY MASS INDEX (BMI) OF 45.0 TO 49.9 IN ADULT, UNSPECIFIED OBESITY TYPE: ICD-10-CM

## 2025-02-14 DIAGNOSIS — E66.01 CLASS 3 SEVERE OBESITY WITH SERIOUS COMORBIDITY AND BODY MASS INDEX (BMI) OF 45.0 TO 49.9 IN ADULT, UNSPECIFIED OBESITY TYPE: Primary | ICD-10-CM

## 2025-02-14 DIAGNOSIS — G47.9 SLEEP DIFFICULTIES: ICD-10-CM

## 2025-02-14 DIAGNOSIS — E66.813 CLASS 3 SEVERE OBESITY WITH SERIOUS COMORBIDITY AND BODY MASS INDEX (BMI) OF 45.0 TO 49.9 IN ADULT, UNSPECIFIED OBESITY TYPE: ICD-10-CM

## 2025-02-14 DIAGNOSIS — E66.813 CLASS 3 SEVERE OBESITY WITH SERIOUS COMORBIDITY AND BODY MASS INDEX (BMI) OF 45.0 TO 49.9 IN ADULT, UNSPECIFIED OBESITY TYPE: Primary | ICD-10-CM

## 2025-02-14 DIAGNOSIS — K21.00 GASTROESOPHAGEAL REFLUX DISEASE WITH ESOPHAGITIS WITHOUT HEMORRHAGE: ICD-10-CM

## 2025-02-14 DIAGNOSIS — R31.0 GROSS HEMATURIA: ICD-10-CM

## 2025-02-14 DIAGNOSIS — L65.9 HAIR LOSS: ICD-10-CM

## 2025-02-14 RX ORDER — FINASTERIDE 1 MG/1
1 TABLET, FILM COATED ORAL DAILY
Qty: 90 TABLET | Refills: 3 | Status: SHIPPED | OUTPATIENT
Start: 2025-02-14

## 2025-02-14 RX ORDER — PHENTERMINE AND TOPIRAMATE 3.75; 23 MG/1; MG/1
1 CAPSULE, EXTENDED RELEASE ORAL DAILY
Qty: 30 CAPSULE | Refills: 0 | Status: SHIPPED | OUTPATIENT
Start: 2025-02-14 | End: 2025-03-16

## 2025-02-14 RX ORDER — OMEPRAZOLE 40 MG/1
40 CAPSULE, DELAYED RELEASE ORAL DAILY
Qty: 90 CAPSULE | Refills: 3 | Status: SHIPPED | OUTPATIENT
Start: 2025-02-14 | End: 2026-02-14

## 2025-02-14 RX ORDER — CLONAZEPAM 0.5 MG/1
0.5 TABLET ORAL NIGHTLY PRN
Qty: 30 TABLET | Refills: 0 | Status: SHIPPED | OUTPATIENT
Start: 2025-02-14

## 2025-02-14 RX ORDER — TRAZODONE HYDROCHLORIDE 50 MG/1
100 TABLET ORAL NIGHTLY PRN
Qty: 180 TABLET | Refills: 3 | Status: SHIPPED | OUTPATIENT
Start: 2025-02-14 | End: 2026-02-14

## 2025-02-14 NOTE — PATIENT INSTRUCTIONS
On screen, everything looks good today.      I do worry about blood in the urine.  I am glad to see that it has returned to its normal color.  If you get a chance, stop by the clinic one day next week to leave me a urine sample.  I would like to make sure that it is all gone.  We will look at the urine under a microscope to make sure there are no hidden red blood cells there.  If so, then we will have you follow up with Urology for some additional testing.  If this urine sample is clear, then we can just watch for it in the future.      Let us see if we can try a different medication to help with weight loss.  I am sending a prescription for Qsymia to the pharmacy.  Please let me know if insurance covers it, or not.  If so, for the 1st 10 days, just take one tablet, each morning.  Then, you can increase to two tablets each morning for the next 10 days.    In about three weeks, you will get a [facility] visit message from me.  Click the link and let me know how you are doing.  If doing well on two tablets and you want to stay at that dose, let me know and I will send refills.  If you would like to go up to the next higher dose, let me know that instead and I will send that prescription.    If, for some reason, you can not fill the prescription, please let me know asap.  We can split the medication into its individual components (phentermine, topiramate) and have you take them separately.    Continue to eat a healthy diet.  Be careful with portion sizes.  Includes lots of fresh fruits, vegetables, whole grains, lean proteins.  See info below.    Keep hydrated.  Be sure to drink at least 8-10, 8 oz, glasses of water every day.    Stay active.  Try to do some sort of physical activity every day.  Nothing outrageous, just try walking for 10-15 minutes each day.

## 2025-02-14 NOTE — PROGRESS NOTES
"    [facility]       [address] [place] [address]      Phone: [phone]      Fax: [phone]    Loy Lira MD                Video Visit  02/14/2025        Subjective      HPI:  Basilio Jain is a 44 y.o. male presents today via video for "No chief complaint on file.  ."     44-year-old gentleman presents today via video visit to follow up on a couple of issues.    Overall, No chest pain, shortness on breath.  No fever, chills, body aches.  No coughing, sneezing, URI type symptoms.  Appetite normal.  Bowel movements normal.    You weeks ago, when he urinated saw blood in his urine.  Worried him, so he went to the urgent care.  They checked his urine and it did have blood still in it.  They thought it might be a UTI, so they gave him a prescription for Cipro.  Initially, he had one or two more episodes of in the urine, but then it cleared up.  Now, has completed the medication and does not notice anything when he urinates.  Never had any pain, discomfort.    Still under a bit of stress.  He and his wife are filing for divorce.    Still working on weight loss.  Because of some of the stresses, he was gained some of his weight back.  Went from 275 lb back up to 288 lb.  Stopped using the semaglutide because it made him sick.  Has been working on portion sizes, healthier choices.  Not really successful.  Open to using medication, but would prefer something different.    Has hypertension.  Still not on any medications.  As above, has been working on diet, exercise, weight loss.  Has been checking his blood pressure at home.  Numbers has been running okay.    Also has issues with asthma.  Currently being managed by his ENT.  Was taking Dupixent regularly, but they recently discontinued it.  Also supplements with Xyzal, as needed.  Has a compounded spray that he also uses.      PRP injections for hair growth.  Sees [facility].  Also using OTC Nutrafoil " .    PMH:  HTN.  Pre DM.  Asthma.  Hiatal hernia/GERD.    PSH: Nasal surgery.  EGD.    F MH:  Obesity.  DM.  Grandmother had unknown cancer.    Allergies:  PCN-as a child.  Aspirin caused eye swelling.  Doxycycline caused hives.  Losartan made his feet and legs swell.  Social:  Works in [employer].  Owns two day cares.  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    T: Denies   A: Occasionally/rarely   D:  Denies    Exercise:  Walks on a treadmill 2-3 times per week.  Started lifting weights.  But    ENT:  Dr. Fabio Garza  Counselor: Liane benjamin        The following were updated and reviewed by myself in the chart: medications, past medical history, past surgical history, family history, social history, and allergies.     Medications:  Current Outpatient Medications on File Prior to Visit   Medication Sig Dispense Refill    DUPIXENT  mg/2 mL PnIj       XHANCE 93 mcg/actuation AerB by Each Nostril route.      [DISCONTINUED] clonazePAM (KLONOPIN) 0.5 MG tablet Take 1 tablet (0.5 mg total) by mouth nightly as needed for Anxiety (or sleep). 30 tablet 0    [DISCONTINUED] finasteride (PROPECIA) 1 mg tablet Take 1 tablet (1 mg total) by mouth once daily. 90 tablet 3    [DISCONTINUED] omeprazole (PRILOSEC) 40 MG capsule Take 1 capsule (40 mg total) by mouth once daily. 90 capsule 3    [DISCONTINUED] traZODone (DESYREL) 50 MG tablet Take 2 tablets (100 mg total) by mouth nightly as needed for Insomnia. 180 tablet 3     Current Facility-Administered Medications on File Prior to Visit   Medication Dose Route Frequency Provider Last Rate Last Admin    EPINEPHrine (EPIPEN) 0.3 mg/0.3 mL pen injection 0.3 mg  0.3 mg Intramuscular PRN Jarod Wilson MD            PMHx:  Past Medical History:   Diagnosis Date    Asthma     Sleep apnea, unspecified       Patient Active Problem List    Diagnosis Date Noted    COVID-19 07/30/2021    ALDO (generalized anxiety disorder) 03/08/2021    Status post nasal surgery  03/05/2021    Immunization deficiency 01/05/2021    Pre-diabetes 01/05/2021    Essential hypertension 12/29/2020    Snores 12/29/2020    Morbid obesity with BMI of 50.0-59.9, adult 01/03/2018        PSHx:  Past Surgical History:   Procedure Laterality Date    EYE SURGERY  2004    Lasik    HERNIA REPAIR      NASAL SEPTOPLASTY  2021    WISDOM TOOTH EXTRACTION      X 4        FHx:  Family History   Problem Relation Name Age of Onset    Hypertension Mother Lakisha Jain     Atrial fibrillation Mother Lakisha Jain     Hypothyroidism Mother Lakisha Jain     Diabetes Mother Lakisha Jain     Hypertension Father      ADD / ADHD Sister          Social:  Social History     Socioeconomic History    Marital status:    Tobacco Use    Smoking status: Never    Smokeless tobacco: Never   Substance and Sexual Activity    Alcohol use: Yes     Comment: Occasional    Drug use: No    Sexual activity: Yes     Partners: Female     Birth control/protection: Other-see comments     Comment: Spouse had hysteroctomy     Social Drivers of Health     Financial Resource Strain: Patient Declined (9/7/2023)    Overall Financial Resource Strain (CARDIA)     Difficulty of Paying Living Expenses: Patient declined   Food Insecurity: Patient Declined (9/7/2023)    Hunger Vital Sign     Worried About Running Out of Food in the Last Year: Patient declined     Ran Out of Food in the Last Year: Patient declined   Transportation Needs: Patient Declined (9/7/2023)    PRAPARE - Transportation     Lack of Transportation (Medical): Patient declined     Lack of Transportation (Non-Medical): Patient declined   Physical Activity: Unknown (9/7/2023)    Exercise Vital Sign     Days of Exercise per Week: 0 days   Stress: No Stress Concern Present (9/7/2023)    [place] [employer] - Occupational Stress Questionnaire     Feeling of Stress : Not at all   Housing Stability: Unknown (9/7/2023)    Housing Stability Vital Sign     Unable to Pay for  "Housing in the Last Year: Patient refused     Unstable Housing in the Last Year: Patient refused        Allergies:  Review of patient's allergies indicates:   Allergen Reactions    Asa [aspirin] Swelling    Doxycycline Hives and Rash    Losartan Edema    Pcn [penicillins] Anaphylaxis        ROS:  Review of Systems   Constitutional:  Negative for activity change, appetite change, chills, fever and unexpected weight change.   HENT:  Negative for congestion, hearing loss, postnasal drip, rhinorrhea, sore throat and trouble swallowing.    Eyes:  Negative for discharge and visual disturbance.   Respiratory:  Negative for cough, chest tightness, shortness of breath and wheezing.    Cardiovascular:  Negative for chest pain and palpitations.   Gastrointestinal:  Negative for abdominal pain, blood in stool, constipation, diarrhea, nausea and vomiting.   Endocrine: Negative for polydipsia and polyuria.   Genitourinary:  Positive for hematuria. Negative for difficulty urinating and urgency.   Musculoskeletal:  Negative for arthralgias, joint swelling, myalgias and neck pain.   Skin:  Negative for color change and rash.   Neurological:  Negative for weakness and headaches.   Psychiatric/Behavioral:  Negative for confusion and dysphoric mood.    All other systems reviewed and are negative.         Objective      There were no vitals taken for this visit.  Ht Readings from Last 3 Encounters:   11/14/24 5' 9" (1.753 m)   05/08/24 5' 9" (1.753 m)   11/08/23 5' 9" (1.753 m)     Wt Readings from Last 3 Encounters:   11/14/24 132.4 kg (291 lb 14.2 oz)   05/08/24 (!) 149.9 kg (330 lb 7.5 oz)   11/08/23 (!) 143.7 kg (316 lb 12.8 oz)       PHYSICAL EXAM:  Physical Exam  Constitutional:       General: He is not in acute distress.     Appearance: Normal appearance. He is not ill-appearing.   HENT:      Head: Normocephalic and atraumatic.   Pulmonary:      Effort: Pulmonary effort is normal. No respiratory distress.   Neurological:      " Mental Status: He is alert.   Psychiatric:         Mood and Affect: Mood normal.         Behavior: Behavior normal.         Thought Content: Thought content normal.              LABS / IMAGING:  Recent Results (from the past 26 weeks)   Lipid Panel    Collection Time: 11/13/24  7:19 AM   Result Value Ref Range    Cholesterol 158 120 - 199 mg/dL    Triglycerides 94 30 - 150 mg/dL    HDL 48 40 - 75 mg/dL    LDL Cholesterol 91.2 63.0 - 159.0 mg/dL    HDL/Cholesterol Ratio 30.4 20.0 - 50.0 %    Total Cholesterol/HDL Ratio 3.3 2.0 - 5.0    Non-HDL Cholesterol 110 mg/dL   TSH    Collection Time: 11/13/24  7:19 AM   Result Value Ref Range    TSH 1.499 0.400 - 4.000 uIU/mL   Comprehensive Metabolic Panel    Collection Time: 11/13/24  7:19 AM   Result Value Ref Range    Sodium 143 136 - 145 mmol/L    Potassium 4.1 3.5 - 5.1 mmol/L    Chloride 109 95 - 110 mmol/L    CO2 25 23 - 29 mmol/L    Glucose 103 70 - 110 mg/dL    BUN 13 6 - 20 mg/dL    Creatinine 0.9 0.5 - 1.4 mg/dL    Calcium 10.0 8.7 - 10.5 mg/dL    Total Protein 7.3 6.0 - 8.4 g/dL    Albumin 4.0 3.5 - 5.2 g/dL    Total Bilirubin 0.6 0.1 - 1.0 mg/dL    Alkaline Phosphatase 71 40 - 150 U/L    AST 18 10 - 40 U/L    ALT 20 10 - 44 U/L    eGFR >60.0 >60 mL/min/1.73 m^2    Anion Gap 9 8 - 16 mmol/L   CBC Auto Differential    Collection Time: 11/13/24  7:19 AM   Result Value Ref Range    WBC 9.32 3.90 - 12.70 K/uL    RBC 5.21 4.60 - 6.20 M/uL    Hemoglobin 15.7 14.0 - 18.0 g/dL    Hematocrit 48.5 40.0 - 54.0 %    MCV 93 82 - 98 fL    MCH 30.1 27.0 - 31.0 pg    MCHC 32.4 32.0 - 36.0 g/dL    RDW 13.4 11.5 - 14.5 %    Platelets 368 150 - 450 K/uL    MPV 11.0 9.2 - 12.9 fL    Immature Granulocytes 0.2 0.0 - 0.5 %    Gran # (ANC) 5.0 1.8 - 7.7 K/uL    Immature Grans (Abs) 0.02 0.00 - 0.04 K/uL    Lymph # 2.5 1.0 - 4.8 K/uL    Mono # 0.9 0.3 - 1.0 K/uL    Eos # 0.8 (H) 0.0 - 0.5 K/uL    Baso # 0.14 0.00 - 0.20 K/uL    nRBC 0 0 /100 WBC    Gran % 53.6 38.0 - 73.0 %    Lymph %  26.7 18.0 - 48.0 %    Mono % 9.1 4.0 - 15.0 %    Eosinophil % 8.9 (H) 0.0 - 8.0 %    Basophil % 1.5 0.0 - 1.9 %    Differential Method Automated    Hemoglobin A1C    Collection Time: 11/13/24  7:19 AM   Result Value Ref Range    Hemoglobin A1C 5.3 4.0 - 5.6 %    Estimated Avg Glucose 105 68 - 131 mg/dL   PSA, Screening    Collection Time: 11/13/24  7:19 AM   Result Value Ref Range    PSA, Screen 0.82 0.00 - 4.00 ng/mL   C. trachomatis/N. gonorrhoeae by AMP DNA [facility]; Urine    Collection Time: 11/14/24 10:45 AM   Result Value Ref Range    Chlamydia, Amplified DNA Not Detected Not Detected    N gonorrhoeae, amplified DNA Not Detected Not Detected         Assessment    1. Class 3 severe obesity with serious comorbidity and body mass index (BMI) of 45.0 to 49.9 in adult, unspecified obesity type    2. Sleep difficulties    3. Hair loss    4. Gastroesophageal reflux disease with esophagitis without hemorrhage    5. Gross hematuria          Plan    Diagnoses and all orders for this visit:    Class 3 severe obesity with serious comorbidity and body mass index (BMI) of 45.0 to 49.9 in adult, unspecified obesity type  -     phentermine-topiramate (QSYMIA) 3.75-23 mg CM24; Take 1 capsule by mouth Daily.  -     Cancel: MYC E-Visit  -     MYC E-Visit    Sleep difficulties  -     clonazePAM (KLONOPIN) 0.5 MG tablet; Take 1 tablet (0.5 mg total) by mouth nightly as needed for Anxiety (or sleep).  -     traZODone (DESYREL) 50 MG tablet; Take 2 tablets (100 mg total) by mouth nightly as needed for Insomnia.    Hair loss  -     finasteride (PROPECIA) 1 mg tablet; Take 1 tablet (1 mg total) by mouth once daily.    Gastroesophageal reflux disease with esophagitis without hemorrhage  -     omeprazole (PRILOSEC) 40 MG capsule; Take 1 capsule (40 mg total) by mouth once daily.    Gross hematuria  -     Urinalysis Microscopic; Future  -     Urinalysis; Future    On screen, everything looks good.    Recent blood work all looks fine.       Orders for urinalysis, microscopy placed today.  We would like him to stop by the clinic one day next week to leave a urine sample.  If we see persistent hematuria, then we will get him in with Urology for additional workup.      Continue to focus on diet, exercise.  Rather than use semaglutide/tirzepatide, we can try Qsymia.  He states his daughter gets that medication and the cost is affordable with his insurance.  Start with 3.75-23 mg daily for 10 days.  Then, he can take two capsules totaling 7.5-46 mg for the next 10 days.    E visit scheduled in 20 days to check progress.  If comfortable at that dose, then we can send a new prescription for the 7.5-46 mg tablets.  Otherwise, we can go up on the dose if needed.    Other med refills, as above.    FOLLOW-UP:  Follow up if symptoms worsen or fail to improve.    The patient location is:  [place]  The chief complaint leading to consultation is:  Weight loss, other issues    Visit type: audiovisual    Face to Face time with patient: 20 minutes    40 minutes of total time spent on the encounter, which includes face to face time and non-face to face time preparing to see the patient (eg, review of tests), Obtaining and/or reviewing separately obtained history, Documenting clinical information in the electronic or other health record, Independently interpreting results (not separately reported) and communicating results to the patient/family/caregiver, or Care coordination (not separately reported). Visit today included increased complexity associated with the care of the episodic problem addressed and managing the longitudinal care of the patient due to the serious and/or complex managed problem(s).    Each patient to whom he or she provides medical services by telemedicine is:  (1) informed of the relationship between the physician and patient and the respective role of any other health care provider with respect to management of the patient; and (2) notified  that he or she may decline to receive medical services by telemedicine and may withdraw from such care at any time.    Signed by:  Loy Lira MD

## 2025-02-18 RX ORDER — PHENTERMINE AND TOPIRAMATE 3.75; 23 MG/1; MG/1
1 CAPSULE, EXTENDED RELEASE ORAL DAILY
Qty: 14 CAPSULE | Refills: 0 | Status: SHIPPED | OUTPATIENT
Start: 2025-02-18 | End: 2025-03-04

## 2025-02-18 RX ORDER — PHENTERMINE AND TOPIRAMATE 7.5; 46 MG/1; MG/1
1 CAPSULE, EXTENDED RELEASE ORAL DAILY
Qty: 30 CAPSULE | Refills: 5 | Status: SHIPPED | OUTPATIENT
Start: 2025-02-18 | End: 2025-03-20

## 2025-02-18 NOTE — TELEPHONE ENCOUNTER
No care due was identified.  [facility] Embedded Care Due Messages. Reference number: [id number].   2/18/2025 10:33:29 AM CST

## 2025-04-29 ENCOUNTER — E-VISIT (OUTPATIENT)
Dept: PRIMARY CARE CLINIC | Facility: CLINIC | Age: 45
End: 2025-04-29
Payer: COMMERCIAL

## 2025-04-29 DIAGNOSIS — E66.813 CLASS 3 SEVERE OBESITY WITH SERIOUS COMORBIDITY AND BODY MASS INDEX (BMI) OF 45.0 TO 49.9 IN ADULT, UNSPECIFIED OBESITY TYPE: Primary | ICD-10-CM

## 2025-04-29 DIAGNOSIS — E66.01 CLASS 3 SEVERE OBESITY WITH SERIOUS COMORBIDITY AND BODY MASS INDEX (BMI) OF 45.0 TO 49.9 IN ADULT, UNSPECIFIED OBESITY TYPE: Primary | ICD-10-CM

## 2025-04-29 RX ORDER — TIRZEPATIDE 2.5 MG/.5ML
2.5 INJECTION, SOLUTION SUBCUTANEOUS
Qty: 2 ML | Refills: 12 | Status: SHIPPED | OUTPATIENT
Start: 2025-04-29

## 2025-04-29 NOTE — PROGRESS NOTES
Patient ID: Basilio Jain is a 44 y.o. male.    Chief Complaint: General Illness (Entered automatically based on patient selection in [employer].)    The patient initiated a request through [employer] on 4/29/2025 for evaluation and management with a chief complaint of General Illness (Entered automatically based on patient selection in [employer].)     I evaluated the questionnaire submission on 04/29/2025.    [facility] E[facility]-Medication    4/29/2025  8:03 AM CDT - Filed by Patient   What do you need help with? Medication Request   Do you agree to participate in an E-Visit? Yes   If you have any of the following symptoms, please present to your local emergency room or call 911:  I acknowledge   Medication requests for narcotics will not be addressed via an E-Visit.  Please schedule an appointment. I acknowledge   Do you want to address a new or existing medication? I would like to start a new medication that I do not already take   What is the main issue you would like addressed today? Weight loss prescription. I am not seeing any changes w current meds and would like to consider monjero   What is the name of the medication that you would like to start? Monjero   Have you taken a similar medication in the past? Yes   What was the name of the similar medication? Ozempic   Why are you no longer on that medication? Medication does not work   What effect has your medication had on your problem? None    What medical condition is the  medication intended to treat? Weight   Provide any additional information you feel is important.    Please attach any relevant images or files    Are you able to take your vital signs? No         Encounter Diagnosis   Name Primary?    Class 3 severe obesity with serious comorbidity and body mass index (BMI) of 45.0 to 49.9 in adult, unspecified obesity type Yes        No orders of the defined types were placed in this encounter.     Medications Ordered This Encounter   Medications     ZEPBOUND 2.5 mg/0.5 mL PnIj     Sig: Inject 2.5 mg into the skin every 7 days.     Dispense:  2 mL     Refill:  12      He was requesting Mounjaro, but he was not diabetic.      Instead, sending prescription for ZepBound to the pharmacy.  PA attempted, but stuck in pending status.  When attempted on cover my meds, it says coverage can not be verified.  Requested updated insurance card from patient.      Also, faxing prescription for [facility]epatide to [facility].  He can start with 30 units of that, which is equivalent to 2.5 mg of tirzepatide.      Follow up in about 4 weeks (around 5/27/2025) for recheck.      E-Visit Time Tracking:    Day 1 Time (in minutes): 15    Total Time (in minutes): 15

## 2025-06-23 ENCOUNTER — PATIENT MESSAGE (OUTPATIENT)
Dept: PRIMARY CARE CLINIC | Facility: CLINIC | Age: 45
End: 2025-06-23

## 2025-07-03 ENCOUNTER — PATIENT MESSAGE (OUTPATIENT)
Dept: PRIMARY CARE CLINIC | Facility: CLINIC | Age: 45
End: 2025-07-03

## 2025-07-03 ENCOUNTER — E-VISIT (OUTPATIENT)
Dept: PRIMARY CARE CLINIC | Facility: CLINIC | Age: 45
End: 2025-07-03
Payer: COMMERCIAL

## 2025-07-03 DIAGNOSIS — E66.813 CLASS 3 SEVERE OBESITY WITH SERIOUS COMORBIDITY AND BODY MASS INDEX (BMI) OF 45.0 TO 49.9 IN ADULT, UNSPECIFIED OBESITY TYPE: Primary | ICD-10-CM

## 2025-07-03 DIAGNOSIS — E66.01 CLASS 3 SEVERE OBESITY WITH SERIOUS COMORBIDITY AND BODY MASS INDEX (BMI) OF 45.0 TO 49.9 IN ADULT, UNSPECIFIED OBESITY TYPE: Primary | ICD-10-CM

## 2025-07-03 NOTE — PROGRESS NOTES
Patient ID: Basilio Jain is a 45 y.o. male.    Chief Complaint: General Illness (Entered automatically based on patient selection in [employer].)    The patient initiated a request through [employer] on 7/3/2025 for evaluation and management with a chief complaint of General Illness (Entered automatically based on patient selection in [employer].)     I evaluated the questionnaire submission on 07/03/2025.    [facility] P[facility] Evisit [facility]-Medication    7/3/2025  8:41 AM CDT - Filed by Patient   What do you need help with? Medication Request   Do you agree to participate in an E-Visit? Yes   If you have any of the following symptoms, please present to your local emergency room or call 911:  I acknowledge   Medication requests for narcotics will not be addressed via an E-Visit.  Please schedule an appointment. I acknowledge   Do you want to address a new or existing medication? (Start a new medication, Address a current medication) Address a current medication   What is the main issue you would like addressed today? Increase dosage   Would you like to change or continue your medication? (Change medication, Continue medication) Continue medication   What is the name of the medication you would like to continue? Zepbound   Are you taking your medication as prescribed? Yes   Which option below best describes the reason for your request? (Renew refills, Prior authorization is required) Renew refills    What medical condition is the  medication intended to treat? Weight loss   Has the medication helped your condition? (Yes, No, Not sure) Yes   Have you experienced any side effects from the medication? No   Provide any information you feel is important to your history not asked above I am just lookijg to increade dosage. I have had no side effects or issues this far with current dose   Please attach any relevant images or files    Are you able to take your vitals? No         Encounter Diagnosis   Name Primary?    Class 3 severe  obesity with serious comorbidity and body mass index (BMI) of 45.0 to 49.9 in adult, unspecified obesity type Yes        No orders of the defined types were placed in this encounter.       Okay to increase to 5 mg weekly.  Instead of [facility], we will try using [facility]'s pharmacy.  They provide prefilled syringes.  Prescription faxed for tirzepatide 5 mg weekly.    Follow up in about 3 months (around 10/3/2025) for recheck.      E-Visit Time Tracking:    Day 1 Time (in minutes): 7    Total Time (in minutes): 7